# Patient Record
Sex: FEMALE | Race: WHITE | NOT HISPANIC OR LATINO | Employment: UNEMPLOYED | URBAN - METROPOLITAN AREA
[De-identification: names, ages, dates, MRNs, and addresses within clinical notes are randomized per-mention and may not be internally consistent; named-entity substitution may affect disease eponyms.]

---

## 2018-04-11 ENCOUNTER — LAB VISIT (OUTPATIENT)
Dept: LAB | Facility: HOSPITAL | Age: 48
End: 2018-04-11
Attending: FAMILY MEDICINE
Payer: MEDICAID

## 2018-04-11 ENCOUNTER — OFFICE VISIT (OUTPATIENT)
Dept: FAMILY MEDICINE | Facility: CLINIC | Age: 48
End: 2018-04-11
Payer: MEDICAID

## 2018-04-11 VITALS
HEIGHT: 63 IN | WEIGHT: 183 LBS | BODY MASS INDEX: 32.43 KG/M2 | DIASTOLIC BLOOD PRESSURE: 57 MMHG | OXYGEN SATURATION: 97 % | TEMPERATURE: 97 F | SYSTOLIC BLOOD PRESSURE: 124 MMHG | HEART RATE: 82 BPM | RESPIRATION RATE: 16 BRPM

## 2018-04-11 DIAGNOSIS — E03.9 ACQUIRED HYPOTHYROIDISM: ICD-10-CM

## 2018-04-11 DIAGNOSIS — E03.9 ACQUIRED HYPOTHYROIDISM: Primary | ICD-10-CM

## 2018-04-11 DIAGNOSIS — M62.838 MUSCLE SPASM: ICD-10-CM

## 2018-04-11 LAB
ALBUMIN SERPL BCP-MCNC: 4.6 G/DL
ALP SERPL-CCNC: 49 U/L
ALT SERPL W/O P-5'-P-CCNC: 27 U/L
ANION GAP SERPL CALC-SCNC: 8 MMOL/L
AST SERPL-CCNC: 26 U/L
BASOPHILS # BLD AUTO: 0.08 K/UL
BASOPHILS NFR BLD: 1.1 %
BILIRUB SERPL-MCNC: 0.4 MG/DL
BUN SERPL-MCNC: 16 MG/DL
CALCIUM SERPL-MCNC: 10 MG/DL
CHLORIDE SERPL-SCNC: 105 MMOL/L
CHOLEST SERPL-MCNC: 207 MG/DL
CHOLEST/HDLC SERPL: 5.2 {RATIO}
CO2 SERPL-SCNC: 25 MMOL/L
CREAT SERPL-MCNC: 0.5 MG/DL
DIFFERENTIAL METHOD: NORMAL
EOSINOPHIL # BLD AUTO: 0.2 K/UL
EOSINOPHIL NFR BLD: 2.2 %
ERYTHROCYTE [DISTWIDTH] IN BLOOD BY AUTOMATED COUNT: 11.9 %
EST. GFR  (AFRICAN AMERICAN): >60 ML/MIN/1.73 M^2
EST. GFR  (NON AFRICAN AMERICAN): >60 ML/MIN/1.73 M^2
GLUCOSE SERPL-MCNC: 96 MG/DL
HCT VFR BLD AUTO: 42.9 %
HDLC SERPL-MCNC: 40 MG/DL
HDLC SERPL: 19.3 %
HGB BLD-MCNC: 14.8 G/DL
IMM GRANULOCYTES # BLD AUTO: 0.02 K/UL
IMM GRANULOCYTES NFR BLD AUTO: 0.3 %
LDLC SERPL CALC-MCNC: 140.4 MG/DL
LYMPHOCYTES # BLD AUTO: 3 K/UL
LYMPHOCYTES NFR BLD: 41.4 %
MCH RBC QN AUTO: 30.5 PG
MCHC RBC AUTO-ENTMCNC: 34.5 G/DL
MCV RBC AUTO: 88 FL
MONOCYTES # BLD AUTO: 0.4 K/UL
MONOCYTES NFR BLD: 5.3 %
NEUTROPHILS # BLD AUTO: 3.6 K/UL
NEUTROPHILS NFR BLD: 49.7 %
NONHDLC SERPL-MCNC: 167 MG/DL
NRBC BLD-RTO: 0 /100 WBC
PLATELET # BLD AUTO: 280 K/UL
PMV BLD AUTO: 10.3 FL
POTASSIUM SERPL-SCNC: 4.3 MMOL/L
PROT SERPL-MCNC: 7.9 G/DL
RBC # BLD AUTO: 4.86 M/UL
SODIUM SERPL-SCNC: 138 MMOL/L
T4 FREE SERPL-MCNC: 1.31 NG/DL
TRIGL SERPL-MCNC: 133 MG/DL
TSH SERPL DL<=0.005 MIU/L-ACNC: 0.12 UIU/ML
WBC # BLD AUTO: 7.23 K/UL

## 2018-04-11 PROCEDURE — 99203 OFFICE O/P NEW LOW 30 MIN: CPT | Mod: PBBFAC,PN | Performed by: FAMILY MEDICINE

## 2018-04-11 PROCEDURE — 84439 ASSAY OF FREE THYROXINE: CPT

## 2018-04-11 PROCEDURE — 84443 ASSAY THYROID STIM HORMONE: CPT

## 2018-04-11 PROCEDURE — 80061 LIPID PANEL: CPT

## 2018-04-11 PROCEDURE — 99213 OFFICE O/P EST LOW 20 MIN: CPT | Mod: S$PBB,,, | Performed by: FAMILY MEDICINE

## 2018-04-11 PROCEDURE — 80053 COMPREHEN METABOLIC PANEL: CPT

## 2018-04-11 PROCEDURE — 99999 PR PBB SHADOW E&M-NEW PATIENT-LVL III: CPT | Mod: PBBFAC,,, | Performed by: FAMILY MEDICINE

## 2018-04-11 PROCEDURE — 36415 COLL VENOUS BLD VENIPUNCTURE: CPT

## 2018-04-11 PROCEDURE — 85025 COMPLETE CBC W/AUTO DIFF WBC: CPT

## 2018-04-11 RX ORDER — LEVOTHYROXINE SODIUM 112 UG/1
TABLET ORAL
COMMUNITY
End: 2018-06-03

## 2018-04-11 RX ORDER — LEVOTHYROXINE SODIUM 125 UG/1
TABLET ORAL
COMMUNITY
End: 2018-07-12

## 2018-04-11 RX ORDER — IBUPROFEN 800 MG/1
TABLET ORAL
COMMUNITY
End: 2019-11-27

## 2018-04-11 RX ORDER — TIZANIDINE 4 MG/1
TABLET ORAL
COMMUNITY
End: 2019-09-27 | Stop reason: SDUPTHER

## 2018-04-11 RX ORDER — ALPRAZOLAM 0.5 MG/1
TABLET ORAL
COMMUNITY
End: 2021-03-31

## 2018-04-11 RX ORDER — OMEPRAZOLE 40 MG/1
CAPSULE, DELAYED RELEASE ORAL
COMMUNITY
End: 2018-06-03

## 2018-04-11 RX ORDER — ZOLPIDEM TARTRATE 10 MG/1
TABLET ORAL
COMMUNITY
End: 2018-08-10 | Stop reason: SDUPTHER

## 2018-04-11 RX ORDER — ONDANSETRON 4 MG/1
TABLET, FILM COATED ORAL
COMMUNITY
End: 2019-09-27

## 2018-04-11 RX ORDER — NAPROXEN 500 MG/1
TABLET ORAL
COMMUNITY
Start: 2018-03-05 | End: 2019-11-27

## 2018-04-11 RX ORDER — LOVASTATIN 10 MG/1
TABLET ORAL
COMMUNITY
Start: 2018-03-09 | End: 2019-02-18

## 2018-04-11 RX ORDER — ROPINIROLE 0.5 MG/1
TABLET, FILM COATED ORAL
COMMUNITY
End: 2018-07-12 | Stop reason: SDUPTHER

## 2018-04-11 NOTE — PROGRESS NOTES
Subjective:       Patient ID: Jazmine Causey is a 47 y.o. female.    Chief Complaint: Abdominal Pain (R side ) and Follow-up    Stil having some back pain with some muscle spasms, triggered by lifting a heavy object,     Taking levothyroxine, good energy level, weight stable, feels great.      Review of Systems   Constitutional: Negative for activity change, appetite change, chills, fatigue and fever.   HENT: Negative for congestion, dental problem, facial swelling, nosebleeds, postnasal drip, sinus pain, sore throat, trouble swallowing and voice change.    Eyes: Negative for pain, discharge and visual disturbance.   Respiratory: Negative for apnea, cough, chest tightness and shortness of breath.    Cardiovascular: Negative for chest pain and palpitations.   Gastrointestinal: Negative for abdominal pain, blood in stool, constipation and nausea.   Endocrine: Negative for cold intolerance, polydipsia and polyuria.   Genitourinary: Negative for difficulty urinating, enuresis and flank pain.   Musculoskeletal: Positive for myalgias. Negative for arthralgias and back pain.   Skin: Negative for color change.   Allergic/Immunologic: Negative for environmental allergies and immunocompromised state.   Neurological: Negative for dizziness and light-headedness.   Hematological: Negative for adenopathy.   Psychiatric/Behavioral: Negative for agitation, behavioral problems, decreased concentration and dysphoric mood. The patient is not nervous/anxious.    All other systems reviewed and are negative.      Past Medical History:   Diagnosis Date    GERD (gastroesophageal reflux disease)     Hyperlipidemia      Past Surgical History:   Procedure Laterality Date    ENDOMETRIAL ABLATION      TUBAL LIGATION       Social History     Social History    Marital status: Single     Spouse name: N/A    Number of children: N/A    Years of education: N/A     Occupational History    Not on file.     Social History Main Topics     "Smoking status: Current Every Day Smoker     Packs/day: 1.00    Smokeless tobacco: Not on file    Alcohol use No    Drug use: Unknown    Sexual activity: Not on file     Other Topics Concern    Not on file     Social History Narrative    No narrative on file     No family history on file.    Objective:      BP (!) 124/57 (BP Location: Right arm, Patient Position: Sitting)   Pulse 82   Temp 97.3 °F (36.3 °C)   Resp 16   Ht 5' 3" (1.6 m)   Wt 83 kg (183 lb)   SpO2 97%   BMI 32.42 kg/m²   Physical Exam   Constitutional: She is oriented to person, place, and time. She appears well-developed and well-nourished. No distress.   HENT:   Head: Normocephalic and atraumatic.   Nose: Nose normal.   Mouth/Throat: Oropharynx is clear and moist. No oropharyngeal exudate.   Eyes: Conjunctivae and EOM are normal. Pupils are equal, round, and reactive to light. No scleral icterus.   Neck: Normal range of motion. Neck supple. No thyromegaly present.   Cardiovascular: Normal rate, regular rhythm and normal heart sounds.  Exam reveals no gallop and no friction rub.    No murmur heard.  Pulmonary/Chest: Effort normal and breath sounds normal. No respiratory distress. She has no wheezes. She has no rales. She exhibits no tenderness.   Abdominal: Soft. Bowel sounds are normal. She exhibits no distension. There is no tenderness. There is no guarding.   Musculoskeletal: Normal range of motion. She exhibits tenderness (mild tenderness of r paraspinals.). She exhibits no edema or deformity.   Lymphadenopathy:     She has no cervical adenopathy.   Neurological: She is alert and oriented to person, place, and time. She displays normal reflexes. No cranial nerve deficit or sensory deficit. She exhibits normal muscle tone.   Skin: Skin is warm and dry. No rash noted. She is not diaphoretic. No erythema. No pallor.   Psychiatric: She has a normal mood and affect. Her behavior is normal. Judgment and thought content normal.   Nursing " note and vitals reviewed.      Assessment:       1. Acquired hypothyroidism    2. Muscle spasm        Plan:       Acquired hypothyroidism  -     TSH; Future; Expected date: 04/11/2018  -     Comprehensive metabolic panel; Future; Expected date: 04/11/2018  -     CBC auto differential; Future; Expected date: 04/11/2018  -     Lipid panel; Future; Expected date: 04/11/2018    Muscle spasm  - Encouraged patient to take tizanidine.            Risks, benefits, and side effects were discussed with the patient. All questions were answered to the fullest satisfaction of the patient, and pt verbalized understanding and agreement to treatment plan. Pt was to call with any new or worsening symptoms, or present to the ER.

## 2018-04-12 ENCOUNTER — TELEPHONE (OUTPATIENT)
Dept: FAMILY MEDICINE | Facility: CLINIC | Age: 48
End: 2018-04-12

## 2018-04-12 NOTE — TELEPHONE ENCOUNTER
----- Message from Francisco Javier Cloud MD sent at 4/12/2018  7:59 AM CDT -----  Please let this lady know that her labs look great, save that her thyroid function shows that we may her on too strong a dose of medicine. However, I would like to recheck again in 3 months since she is feeling so well before adjusting the dose.

## 2018-06-03 PROCEDURE — 99284 EMERGENCY DEPT VISIT MOD MDM: CPT | Mod: 25

## 2018-06-03 PROCEDURE — 96372 THER/PROPH/DIAG INJ SC/IM: CPT

## 2018-06-03 PROCEDURE — 99283 EMERGENCY DEPT VISIT LOW MDM: CPT | Mod: 25

## 2018-06-04 ENCOUNTER — HOSPITAL ENCOUNTER (EMERGENCY)
Facility: HOSPITAL | Age: 48
Discharge: HOME | End: 2018-06-04
Attending: EMERGENCY MEDICINE
Payer: MEDICAID

## 2018-06-04 VITALS
WEIGHT: 180 LBS | OXYGEN SATURATION: 98 % | SYSTOLIC BLOOD PRESSURE: 148 MMHG | HEART RATE: 82 BPM | RESPIRATION RATE: 18 BRPM | HEIGHT: 63 IN | TEMPERATURE: 98 F | BODY MASS INDEX: 31.89 KG/M2 | DIASTOLIC BLOOD PRESSURE: 82 MMHG

## 2018-06-04 DIAGNOSIS — J02.9 PHARYNGITIS, UNSPECIFIED ETIOLOGY: Primary | ICD-10-CM

## 2018-06-04 DIAGNOSIS — J32.9 SINUSITIS, UNSPECIFIED CHRONICITY, UNSPECIFIED LOCATION: ICD-10-CM

## 2018-06-04 LAB — DEPRECATED S PYO AG THROAT QL EIA: NEGATIVE

## 2018-06-04 PROCEDURE — 87081 CULTURE SCREEN ONLY: CPT

## 2018-06-04 PROCEDURE — 87880 STREP A ASSAY W/OPTIC: CPT

## 2018-06-04 PROCEDURE — 96372 THER/PROPH/DIAG INJ SC/IM: CPT

## 2018-06-04 PROCEDURE — 63600175 PHARM REV CODE 636 W HCPCS: Performed by: EMERGENCY MEDICINE

## 2018-06-04 RX ORDER — DEXAMETHASONE SODIUM PHOSPHATE 100 MG/10ML
10 INJECTION INTRAMUSCULAR; INTRAVENOUS
Status: COMPLETED | OUTPATIENT
Start: 2018-06-04 | End: 2018-06-04

## 2018-06-04 RX ORDER — ALBUTEROL SULFATE 90 UG/1
1-2 AEROSOL, METERED RESPIRATORY (INHALATION) EVERY 6 HOURS PRN
Qty: 1 INHALER | Refills: 0 | Status: SHIPPED | OUTPATIENT
Start: 2018-06-04 | End: 2022-05-24

## 2018-06-04 RX ORDER — AMOXICILLIN AND CLAVULANATE POTASSIUM 875; 125 MG/1; MG/1
1 TABLET, FILM COATED ORAL 2 TIMES DAILY
Qty: 14 TABLET | Refills: 0 | Status: SHIPPED | OUTPATIENT
Start: 2018-06-04 | End: 2018-07-12

## 2018-06-04 RX ORDER — PREDNISONE 10 MG/1
40 TABLET ORAL DAILY
Qty: 20 TABLET | Refills: 0 | Status: SHIPPED | OUTPATIENT
Start: 2018-06-04 | End: 2018-06-09

## 2018-06-04 RX ADMIN — DEXAMETHASONE SODIUM PHOSPHATE 10 MG: 10 INJECTION INTRAMUSCULAR; INTRAVENOUS at 01:06

## 2018-06-05 NOTE — ED PROVIDER NOTES
Encounter Date: 6/3/2018       History     Chief Complaint   Patient presents with    Sore Throat    Cough     47 y f complains of sore throat and cough with facial congestion and post nasal drip             Review of patient's allergies indicates:   Allergen Reactions    Cephalexin      Past Medical History:   Diagnosis Date    GERD (gastroesophageal reflux disease)     Hyperlipidemia      Past Surgical History:   Procedure Laterality Date    ENDOMETRIAL ABLATION      TUBAL LIGATION       No family history on file.  Social History   Substance Use Topics    Smoking status: Current Every Day Smoker     Packs/day: 1.00    Smokeless tobacco: Never Used    Alcohol use No     Review of Systems   Constitutional: Negative.  Negative for fever.   HENT: Positive for congestion, postnasal drip, sinus pain, sinus pressure and sore throat. Negative for ear discharge, ear pain, facial swelling, sneezing, trouble swallowing and voice change.    Respiratory: Positive for cough and wheezing.    Cardiovascular: Negative.    Gastrointestinal: Negative.    Musculoskeletal: Negative.    Skin: Negative.    Hematological: Negative.    All other systems reviewed and are negative.      Physical Exam     Initial Vitals [06/03/18 2247]   BP Pulse Resp Temp SpO2   (!) 158/85 84 18 98.3 °F (36.8 °C) 97 %      MAP       109.33         Physical Exam    Nursing note and vitals reviewed.  Constitutional: She appears well-developed and well-nourished. She is not diaphoretic. No distress.   HENT:   Head: Normocephalic and atraumatic.   Nose: Nose normal.   Mouth/Throat: Oropharynx is clear and moist. No oropharyngeal exudate.   Eyes: Conjunctivae and EOM are normal. Pupils are equal, round, and reactive to light. Right eye exhibits no discharge. Left eye exhibits no discharge. No scleral icterus.   Neck: Normal range of motion. Neck supple.   Cardiovascular: Normal rate, regular rhythm, normal heart sounds and intact distal pulses. Exam  reveals no gallop and no friction rub.    No murmur heard.  Pulmonary/Chest: No respiratory distress. She has wheezes. She has no rhonchi. She has no rales.   Abdominal: Soft. There is no tenderness.   Musculoskeletal: Normal range of motion. She exhibits no edema or tenderness.   Lymphadenopathy:     She has no cervical adenopathy.   Neurological: She is alert and oriented to person, place, and time. She has normal strength. No cranial nerve deficit or sensory deficit.   Skin: Skin is warm and dry. Capillary refill takes less than 2 seconds. No rash noted. No erythema. No pallor.   Psychiatric: She has a normal mood and affect. Her behavior is normal. Judgment and thought content normal.         ED Course   Procedures  Labs Reviewed   THROAT SCREEN, RAPID   CULTURE, STREP A,  THROAT             Medical Decision Making:   Initial Assessment:   Sinusitis / bronchitis with min wheeze    Treatment as per AVS                       Clinical Impression:   The primary encounter diagnosis was Pharyngitis, unspecified etiology. A diagnosis of Sinusitis, unspecified chronicity, unspecified location was also pertinent to this visit.    No orders to display       Disposition:   Disposition: Discharged  Condition: Stable                        Quintin Alvarez MD  06/05/18 9029

## 2018-06-06 LAB — BACTERIA THROAT CULT: NORMAL

## 2018-07-12 ENCOUNTER — OFFICE VISIT (OUTPATIENT)
Dept: FAMILY MEDICINE | Facility: CLINIC | Age: 48
End: 2018-07-12
Payer: MEDICAID

## 2018-07-12 ENCOUNTER — LAB VISIT (OUTPATIENT)
Dept: LAB | Facility: HOSPITAL | Age: 48
End: 2018-07-12
Attending: FAMILY MEDICINE
Payer: MEDICAID

## 2018-07-12 VITALS
WEIGHT: 186 LBS | SYSTOLIC BLOOD PRESSURE: 133 MMHG | RESPIRATION RATE: 16 BRPM | DIASTOLIC BLOOD PRESSURE: 49 MMHG | BODY MASS INDEX: 32.96 KG/M2 | OXYGEN SATURATION: 98 % | HEIGHT: 63 IN | HEART RATE: 68 BPM

## 2018-07-12 DIAGNOSIS — E03.9 HYPOTHYROIDISM, UNSPECIFIED TYPE: ICD-10-CM

## 2018-07-12 DIAGNOSIS — H10.9 CONJUNCTIVITIS, UNSPECIFIED CONJUNCTIVITIS TYPE, UNSPECIFIED LATERALITY: ICD-10-CM

## 2018-07-12 DIAGNOSIS — G25.81 RESTLESS LEG: ICD-10-CM

## 2018-07-12 DIAGNOSIS — I73.9 PVD (PERIPHERAL VASCULAR DISEASE): Primary | ICD-10-CM

## 2018-07-12 LAB
T4 FREE SERPL-MCNC: 0.88 NG/DL
TSH SERPL DL<=0.005 MIU/L-ACNC: 1.15 UIU/ML

## 2018-07-12 PROCEDURE — 99214 OFFICE O/P EST MOD 30 MIN: CPT | Mod: S$PBB,,, | Performed by: FAMILY MEDICINE

## 2018-07-12 PROCEDURE — 36415 COLL VENOUS BLD VENIPUNCTURE: CPT

## 2018-07-12 PROCEDURE — 84439 ASSAY OF FREE THYROXINE: CPT

## 2018-07-12 PROCEDURE — 99999 PR PBB SHADOW E&M-EST. PATIENT-LVL III: CPT | Mod: PBBFAC,,, | Performed by: FAMILY MEDICINE

## 2018-07-12 PROCEDURE — 84443 ASSAY THYROID STIM HORMONE: CPT

## 2018-07-12 PROCEDURE — 99213 OFFICE O/P EST LOW 20 MIN: CPT | Mod: PBBFAC,PN | Performed by: FAMILY MEDICINE

## 2018-07-12 RX ORDER — LEVOTHYROXINE SODIUM 112 UG/1
TABLET ORAL
COMMUNITY
End: 2018-08-10 | Stop reason: SDUPTHER

## 2018-07-12 RX ORDER — POLYMYXIN B SULFATE AND TRIMETHOPRIM 1; 10000 MG/ML; [USP'U]/ML
1 SOLUTION OPHTHALMIC EVERY 6 HOURS
Qty: 10 ML | Refills: 1 | Status: SHIPPED | OUTPATIENT
Start: 2018-07-12 | End: 2023-09-26 | Stop reason: SDUPTHER

## 2018-07-12 RX ORDER — ROPINIROLE 1 MG/1
1 TABLET, FILM COATED ORAL NIGHTLY
Qty: 90 TABLET | Refills: 3 | Status: SHIPPED | OUTPATIENT
Start: 2018-07-12 | End: 2018-10-12 | Stop reason: SDUPTHER

## 2018-07-12 NOTE — PROGRESS NOTES
Subjective:       Patient ID: Jazmine Causey is a 47 y.o. female.    Chief Complaint: Follow-up; Knee Pain (R knee pain); and Leg Pain (R leg pain)    Patient presents for follow up.      She complains of pain in her legs, feet turning blue, with prolonged standing; noticed a few months ago. Happens daily.    In regards to their hypothyroidism, patient denies poor energy, skin and hair changes, as well as weight gain. Last TSH normal/symmetric, will recheck as per orders.         Also complains of improvement in rls with requip, but not being completely controlled.      Review of Systems   Constitutional: Negative for activity change, appetite change, chills, fatigue and fever.   HENT: Negative for congestion, dental problem, facial swelling, nosebleeds, postnasal drip, sinus pain, sore throat, trouble swallowing and voice change.    Eyes: Negative for pain, discharge and visual disturbance.   Respiratory: Negative for apnea, cough, chest tightness and shortness of breath.    Cardiovascular: Negative for chest pain and palpitations.   Gastrointestinal: Negative for abdominal pain, blood in stool, constipation and nausea.   Endocrine: Negative for cold intolerance, polydipsia and polyuria.   Genitourinary: Negative for difficulty urinating, enuresis and flank pain.   Musculoskeletal: Positive for myalgias. Negative for arthralgias and back pain.   Skin: Negative for color change.   Allergic/Immunologic: Negative for environmental allergies and immunocompromised state.   Neurological: Negative for dizziness and light-headedness.   Hematological: Negative for adenopathy.   Psychiatric/Behavioral: Negative for agitation, behavioral problems, decreased concentration and dysphoric mood. The patient is not nervous/anxious.    All other systems reviewed and are negative.        Reviewed family, medical, surgical, and social history.    Objective:      BP (!) 133/49 (BP Location: Left arm, Patient Position: Sitting, BP  "Method: Medium (Automatic))   Pulse 68   Resp 16   Ht 5' 3" (1.6 m)   Wt 84.4 kg (186 lb)   SpO2 98%   BMI 32.95 kg/m²   Physical Exam   Constitutional: She is oriented to person, place, and time. She appears well-developed and well-nourished. No distress.   HENT:   Head: Normocephalic and atraumatic.   Nose: Nose normal.   Mouth/Throat: Oropharynx is clear and moist. No oropharyngeal exudate.   Eyes: Conjunctivae and EOM are normal. Pupils are equal, round, and reactive to light. No scleral icterus.   Neck: Normal range of motion. Neck supple. No thyromegaly present.   Cardiovascular: Normal rate, regular rhythm and normal heart sounds.  Exam reveals no gallop and no friction rub.    No murmur heard.  Pulmonary/Chest: Effort normal and breath sounds normal. No respiratory distress. She has no wheezes. She has no rales. She exhibits no tenderness.   Abdominal: Soft. Bowel sounds are normal. She exhibits no distension. There is no tenderness. There is no guarding.   Musculoskeletal: Normal range of motion. She exhibits tenderness and deformity. She exhibits no edema.   Lymphadenopathy:     She has no cervical adenopathy.   Neurological: She is alert and oriented to person, place, and time. She displays normal reflexes. No cranial nerve deficit or sensory deficit. She exhibits normal muscle tone.   Skin: Skin is warm and dry. No rash noted. She is not diaphoretic. No erythema. No pallor.   Psychiatric: She has a normal mood and affect. Her behavior is normal. Judgment and thought content normal.   Nursing note and vitals reviewed.      Assessment:       1. PVD (peripheral vascular disease)    2. Restless leg    3. Hypothyroidism, unspecified type    4. Conjunctivitis, unspecified conjunctivitis type, unspecified laterality        Plan:       PVD (peripheral vascular disease)  -     US Lower Extremity Arteries Bilateral; Future; Expected date: 07/12/2018    Restless leg  -     rOPINIRole (REQUIP) 1 MG tablet; " Take 1 tablet (1 mg total) by mouth every evening.  Dispense: 90 tablet; Refill: 3    Hypothyroidism, unspecified type  -     TSH; Future; Expected date: 07/12/2018  -     T4, free; Future; Expected date: 07/12/2018    Conjunctivitis, unspecified conjunctivitis type, unspecified laterality  -     polymyxin B sulf-trimethoprim (POLYTRIM) 10,000 unit- 1 mg/mL Drop; Place 1 drop into the right eye every 6 (six) hours.  Dispense: 10 mL; Refill: 1            Risks, benefits, and side effects were discussed with the patient. All questions were answered to the fullest satisfaction of the patient, and pt verbalized understanding and agreement to treatment plan. Pt was to call with any new or worsening symptoms, or present to the ER.

## 2018-07-13 ENCOUNTER — HOSPITAL ENCOUNTER (OUTPATIENT)
Dept: RADIOLOGY | Facility: HOSPITAL | Age: 48
Discharge: HOME | End: 2018-07-13
Attending: FAMILY MEDICINE
Payer: MEDICAID

## 2018-07-13 ENCOUNTER — TELEPHONE (OUTPATIENT)
Dept: FAMILY MEDICINE | Facility: CLINIC | Age: 48
End: 2018-07-13

## 2018-07-13 DIAGNOSIS — I73.9 PVD (PERIPHERAL VASCULAR DISEASE): ICD-10-CM

## 2018-07-13 PROCEDURE — 93922 UPR/L XTREMITY ART 2 LEVELS: CPT | Mod: 26,,, | Performed by: RADIOLOGY

## 2018-07-13 PROCEDURE — 93922 UPR/L XTREMITY ART 2 LEVELS: CPT | Mod: TC

## 2018-07-13 PROCEDURE — 93925 LOWER EXTREMITY STUDY: CPT | Mod: 26,,, | Performed by: RADIOLOGY

## 2018-07-13 NOTE — TELEPHONE ENCOUNTER
----- Message from Francisco Javier Cloud MD sent at 7/13/2018  8:08 AM CDT -----  Thyroid level is perfect, now, and I would like for her to continue the current dose of levothyroxine.

## 2018-07-13 NOTE — TELEPHONE ENCOUNTER
Spoke with Candace, sister, and relayed that thyroid level is good now and to continue current dose of levothyroxine.  Carla

## 2018-07-16 ENCOUNTER — TELEPHONE (OUTPATIENT)
Dept: FAMILY MEDICINE | Facility: CLINIC | Age: 48
End: 2018-07-16

## 2018-07-16 NOTE — TELEPHONE ENCOUNTER
----- Message from Francisco Javier Cloud MD sent at 7/16/2018 12:40 PM CDT -----  Please let this lady know that her ultrasound was essentially fine

## 2018-08-10 RX ORDER — LEVOTHYROXINE SODIUM 112 UG/1
112 TABLET ORAL
Qty: 30 TABLET | Refills: 11 | Status: SHIPPED | OUTPATIENT
Start: 2018-08-10 | End: 2018-10-12 | Stop reason: SDUPTHER

## 2018-08-10 NOTE — TELEPHONE ENCOUNTER
----- Message from RT Juana sent at 8/10/2018  3:45 PM CDT -----  Contact: Kelsey,[phone], [facility]  Kelsey,[phone], [facility], requesting medication refill: Ambien, thanks.

## 2018-08-13 RX ORDER — ZOLPIDEM TARTRATE 10 MG/1
10 TABLET ORAL NIGHTLY
Qty: 30 TABLET | Refills: 5 | Status: SHIPPED | OUTPATIENT
Start: 2018-08-13 | End: 2019-09-23 | Stop reason: SDUPTHER

## 2018-09-12 ENCOUNTER — TELEPHONE (OUTPATIENT)
Dept: FAMILY MEDICINE | Facility: CLINIC | Age: 48
End: 2018-09-12

## 2018-09-12 DIAGNOSIS — Z12.39 BREAST CANCER SCREENING: Primary | ICD-10-CM

## 2018-09-12 NOTE — TELEPHONE ENCOUNTER
----- Message from Katie Garces sent at 9/12/2018 12:46 PM CDT -----  Contact: YUNG RENEE [[id number]]            Name of Who is Calling: YUNG RENEE [[id number]]      What is the request in detail:  Patient called to request orders for her mammo. Please call her.     Can the clinic reply by [facility]: no      [facility] [phone]

## 2018-09-18 ENCOUNTER — HOSPITAL ENCOUNTER (OUTPATIENT)
Dept: RADIOLOGY | Facility: HOSPITAL | Age: 48
Discharge: HOME | End: 2018-09-18
Attending: FAMILY MEDICINE
Payer: MEDICAID

## 2018-09-18 VITALS — WEIGHT: 185 LBS | HEIGHT: 63 IN | BODY MASS INDEX: 32.78 KG/M2

## 2018-09-18 DIAGNOSIS — Z12.39 BREAST CANCER SCREENING: ICD-10-CM

## 2018-09-18 PROCEDURE — 77067 SCR MAMMO BI INCL CAD: CPT | Mod: 26,,, | Performed by: RADIOLOGY

## 2018-09-18 PROCEDURE — 77067 SCR MAMMO BI INCL CAD: CPT | Mod: TC

## 2018-09-20 ENCOUNTER — TELEPHONE (OUTPATIENT)
Dept: FAMILY MEDICINE | Facility: CLINIC | Age: 48
End: 2018-09-20

## 2018-09-20 NOTE — TELEPHONE ENCOUNTER
Notified pt of normal results. No other concerns at this time.         ----- Message from Francisco Javier Cloud MD sent at 9/19/2018  6:38 PM CDT -----  Please notify patient that their result(s) were normal.

## 2018-10-01 ENCOUNTER — TELEPHONE (OUTPATIENT)
Dept: FAMILY MEDICINE | Facility: CLINIC | Age: 48
End: 2018-10-01

## 2018-10-01 NOTE — TELEPHONE ENCOUNTER
----- Message from Jose Ramon Vital sent at 10/1/2018  4:00 PM CDT -----  Contact: Candace Lyman (Sister)  Name of Who is Calling: Candace      What is the request in detail: Candace is calling requesting to have patient seen on Wednesday due to having muscle spasm in her arms, and legs      Can the clinic reply by [facility]:       [facility] [phone]

## 2018-10-12 ENCOUNTER — TELEPHONE (OUTPATIENT)
Dept: FAMILY MEDICINE | Facility: CLINIC | Age: 48
End: 2018-10-12

## 2018-10-12 ENCOUNTER — LAB VISIT (OUTPATIENT)
Dept: LAB | Facility: HOSPITAL | Age: 48
End: 2018-10-12
Attending: FAMILY MEDICINE
Payer: MEDICAID

## 2018-10-12 ENCOUNTER — OFFICE VISIT (OUTPATIENT)
Dept: FAMILY MEDICINE | Facility: CLINIC | Age: 48
End: 2018-10-12
Payer: MEDICAID

## 2018-10-12 VITALS
HEIGHT: 63 IN | BODY MASS INDEX: 34.75 KG/M2 | RESPIRATION RATE: 18 BRPM | OXYGEN SATURATION: 97 % | SYSTOLIC BLOOD PRESSURE: 137 MMHG | HEART RATE: 79 BPM | WEIGHT: 196.13 LBS | DIASTOLIC BLOOD PRESSURE: 75 MMHG

## 2018-10-12 DIAGNOSIS — G25.81 RESTLESS LEG: ICD-10-CM

## 2018-10-12 DIAGNOSIS — R21 RASH: ICD-10-CM

## 2018-10-12 DIAGNOSIS — E03.9 HYPOTHYROIDISM, UNSPECIFIED TYPE: ICD-10-CM

## 2018-10-12 DIAGNOSIS — E78.5 HYPERLIPIDEMIA, UNSPECIFIED HYPERLIPIDEMIA TYPE: ICD-10-CM

## 2018-10-12 DIAGNOSIS — E03.9 HYPOTHYROIDISM, UNSPECIFIED TYPE: Primary | ICD-10-CM

## 2018-10-12 LAB
ALBUMIN SERPL BCP-MCNC: 4.3 G/DL
ALP SERPL-CCNC: 62 U/L
ALT SERPL W/O P-5'-P-CCNC: 46 U/L
ANION GAP SERPL CALC-SCNC: 9 MMOL/L
AST SERPL-CCNC: 39 U/L
BASOPHILS # BLD AUTO: 0.08 K/UL
BASOPHILS NFR BLD: 0.9 %
BILIRUB SERPL-MCNC: 0.5 MG/DL
BUN SERPL-MCNC: 15 MG/DL
CALCIUM SERPL-MCNC: 9.8 MG/DL
CHLORIDE SERPL-SCNC: 104 MMOL/L
CHOLEST SERPL-MCNC: 270 MG/DL
CHOLEST/HDLC SERPL: 6.3 {RATIO}
CO2 SERPL-SCNC: 25 MMOL/L
CREAT SERPL-MCNC: 0.7 MG/DL
DIFFERENTIAL METHOD: NORMAL
EOSINOPHIL # BLD AUTO: 0.2 K/UL
EOSINOPHIL NFR BLD: 2.2 %
ERYTHROCYTE [DISTWIDTH] IN BLOOD BY AUTOMATED COUNT: 12.8 %
EST. GFR  (AFRICAN AMERICAN): >60 ML/MIN/1.73 M^2
EST. GFR  (NON AFRICAN AMERICAN): >60 ML/MIN/1.73 M^2
GLUCOSE SERPL-MCNC: 100 MG/DL
HCT VFR BLD AUTO: 42.6 %
HDLC SERPL-MCNC: 43 MG/DL
HDLC SERPL: 15.9 %
HGB BLD-MCNC: 14.5 G/DL
IMM GRANULOCYTES # BLD AUTO: 0.03 K/UL
IMM GRANULOCYTES NFR BLD AUTO: 0.4 %
LDLC SERPL CALC-MCNC: 199 MG/DL
LYMPHOCYTES # BLD AUTO: 3.3 K/UL
LYMPHOCYTES NFR BLD: 38 %
MCH RBC QN AUTO: 29.5 PG
MCHC RBC AUTO-ENTMCNC: 34 G/DL
MCV RBC AUTO: 87 FL
MONOCYTES # BLD AUTO: 0.4 K/UL
MONOCYTES NFR BLD: 5.1 %
NEUTROPHILS # BLD AUTO: 4.6 K/UL
NEUTROPHILS NFR BLD: 53.4 %
NONHDLC SERPL-MCNC: 227 MG/DL
NRBC BLD-RTO: 0 /100 WBC
PLATELET # BLD AUTO: 274 K/UL
PMV BLD AUTO: 9.8 FL
POTASSIUM SERPL-SCNC: 4.1 MMOL/L
PROT SERPL-MCNC: 7.5 G/DL
RBC # BLD AUTO: 4.91 M/UL
SODIUM SERPL-SCNC: 138 MMOL/L
T4 FREE SERPL-MCNC: 0.86 NG/DL
TRIGL SERPL-MCNC: 140 MG/DL
TSH SERPL DL<=0.005 MIU/L-ACNC: 17.65 UIU/ML
WBC # BLD AUTO: 8.56 K/UL

## 2018-10-12 PROCEDURE — 84443 ASSAY THYROID STIM HORMONE: CPT

## 2018-10-12 PROCEDURE — 99999 PR PBB SHADOW E&M-EST. PATIENT-LVL III: CPT | Mod: PBBFAC,,, | Performed by: FAMILY MEDICINE

## 2018-10-12 PROCEDURE — 99214 OFFICE O/P EST MOD 30 MIN: CPT | Mod: S$PBB,,, | Performed by: FAMILY MEDICINE

## 2018-10-12 PROCEDURE — 99213 OFFICE O/P EST LOW 20 MIN: CPT | Mod: PBBFAC,PN | Performed by: FAMILY MEDICINE

## 2018-10-12 PROCEDURE — 85025 COMPLETE CBC W/AUTO DIFF WBC: CPT

## 2018-10-12 PROCEDURE — 36415 COLL VENOUS BLD VENIPUNCTURE: CPT

## 2018-10-12 PROCEDURE — 84439 ASSAY OF FREE THYROXINE: CPT

## 2018-10-12 PROCEDURE — 80053 COMPREHEN METABOLIC PANEL: CPT

## 2018-10-12 PROCEDURE — 80061 LIPID PANEL: CPT

## 2018-10-12 RX ORDER — CLOTRIMAZOLE AND BETAMETHASONE DIPROPIONATE 10; .64 MG/G; MG/G
CREAM TOPICAL 2 TIMES DAILY
Qty: 15 G | Refills: 2 | Status: SHIPPED | OUTPATIENT
Start: 2018-10-12

## 2018-10-12 RX ORDER — ROPINIROLE 2 MG/1
2 TABLET, FILM COATED ORAL NIGHTLY
Qty: 30 TABLET | Refills: 2 | Status: SHIPPED | OUTPATIENT
Start: 2018-10-12 | End: 2020-06-17

## 2018-10-12 RX ORDER — LEVOTHYROXINE SODIUM 125 UG/1
125 TABLET ORAL
Qty: 30 TABLET | Refills: 2 | Status: SHIPPED | OUTPATIENT
Start: 2018-10-12 | End: 2018-12-11 | Stop reason: DRUGHIGH

## 2018-10-12 NOTE — TELEPHONE ENCOUNTER
Notified pt of lab results and increase of meds. Voiced understanding, no other concerns at this time.       ----- Message from Francisco Javier Cloud MD sent at 10/12/2018  2:09 PM CDT -----  Thyroid levels were very high, would you confirm that she is taking her thyroid medicine? If so, I will send in an increased dose.

## 2018-10-12 NOTE — TELEPHONE ENCOUNTER
Pt states that she has been taking her thyroid meds as ordered, and she is aware of the increase.   Thank you.       ----- Message from Francisco Javier Cloud MD sent at 10/12/2018  2:09 PM CDT -----  Thyroid levels were very high, would you confirm that she is taking her thyroid medicine? If so, I will send in an increased dose.

## 2018-10-12 NOTE — PROGRESS NOTES
"Subjective:       Patient ID: Jazmine Causey is a 47 y.o. female.    Chief Complaint: Follow-up; Rash (L hand); and Spasms    Follow up    Rash on R hand  Non-severe  Last week  No sick contacts    Muscle spasms in legs at night  Requip helping  No fever      Review of Systems   Constitutional: Negative for activity change, appetite change, chills, fatigue and fever.   HENT: Negative for congestion, dental problem, facial swelling, nosebleeds, postnasal drip, sinus pain, sore throat, trouble swallowing and voice change.    Eyes: Negative for pain, discharge and visual disturbance.   Respiratory: Negative for apnea, cough, chest tightness and shortness of breath.    Cardiovascular: Negative for chest pain and palpitations.   Gastrointestinal: Negative for abdominal pain, blood in stool, constipation and nausea.   Endocrine: Negative for cold intolerance, polydipsia and polyuria.   Genitourinary: Negative for difficulty urinating, enuresis and flank pain.   Musculoskeletal: Positive for myalgias. Negative for arthralgias and back pain.   Skin: Negative for color change.   Allergic/Immunologic: Negative for environmental allergies and immunocompromised state.   Neurological: Negative for dizziness and light-headedness.   Hematological: Negative for adenopathy.   Psychiatric/Behavioral: Negative for agitation, behavioral problems, decreased concentration and dysphoric mood. The patient is not nervous/anxious.    All other systems reviewed and are negative.        Reviewed family, medical, surgical, and social history.    Objective:      /75 (BP Location: Left arm, Patient Position: Sitting, BP Method: Medium (Automatic))   Pulse 79   Resp 18   Ht 5' 3" (1.6 m)   Wt 89 kg (196 lb 1.6 oz)   SpO2 97%   BMI 34.74 kg/m²   Physical Exam   Constitutional: She is oriented to person, place, and time. She appears well-developed and well-nourished. No distress.   HENT:   Head: Normocephalic and atraumatic.   Nose: " Nose normal.   Mouth/Throat: Oropharynx is clear and moist. No oropharyngeal exudate.   Eyes: Conjunctivae and EOM are normal. Pupils are equal, round, and reactive to light. No scleral icterus.   Neck: Normal range of motion. Neck supple. No thyromegaly present.   Cardiovascular: Normal rate, regular rhythm and normal heart sounds. Exam reveals no gallop and no friction rub.   No murmur heard.  Pulmonary/Chest: Effort normal and breath sounds normal. No respiratory distress. She has no wheezes. She has no rales. She exhibits no tenderness.   Abdominal: Soft. Bowel sounds are normal. She exhibits no distension. There is no tenderness. There is no guarding.   Musculoskeletal: Normal range of motion. She exhibits tenderness and deformity. She exhibits no edema.   Lymphadenopathy:     She has no cervical adenopathy.   Neurological: She is alert and oriented to person, place, and time. She displays normal reflexes. No cranial nerve deficit or sensory deficit. She exhibits normal muscle tone.   Skin: Skin is warm and dry. No rash noted. She is not diaphoretic. No erythema. No pallor.   Psychiatric: She has a normal mood and affect. Her behavior is normal. Judgment and thought content normal.   Nursing note and vitals reviewed.      Assessment:       1. Hypothyroidism, unspecified type    2. Restless leg    3. Rash    4. Hyperlipidemia, unspecified hyperlipidemia type        Plan:       Hypothyroidism, unspecified type  -     Lipid panel; Future; Expected date: 10/12/2018  -     CBC auto differential; Future; Expected date: 10/12/2018  -     Comprehensive metabolic panel; Future; Expected date: 10/12/2018  -     TSH; Future; Expected date: 10/12/2018  -     T4, free; Future; Expected date: 10/12/2018    Restless leg  -     rOPINIRole (REQUIP) 2 MG tablet; Take 1 tablet (2 mg total) by mouth every evening.  Dispense: 30 tablet; Refill: 2  -     Lipid panel; Future; Expected date: 10/12/2018  -     CBC auto differential;  Future; Expected date: 10/12/2018  -     Comprehensive metabolic panel; Future; Expected date: 10/12/2018  -     TSH; Future; Expected date: 10/12/2018  -     T4, free; Future; Expected date: 10/12/2018    Rash  -     clotrimazole-betamethasone 1-0.05% (LOTRISONE) cream; Apply topically 2 (two) times daily.  Dispense: 15 g; Refill: 2    Hyperlipidemia, unspecified hyperlipidemia type            Risks, benefits, and side effects were discussed with the patient. All questions were answered to the fullest satisfaction of the patient, and pt verbalized understanding and agreement to treatment plan. Pt was to call with any new or worsening symptoms, or present to the ER.

## 2018-11-01 ENCOUNTER — OFFICE VISIT (OUTPATIENT)
Dept: FAMILY MEDICINE | Facility: CLINIC | Age: 48
End: 2018-11-01
Payer: MEDICAID

## 2018-11-01 VITALS
HEIGHT: 63 IN | SYSTOLIC BLOOD PRESSURE: 135 MMHG | WEIGHT: 194.69 LBS | TEMPERATURE: 98 F | BODY MASS INDEX: 34.5 KG/M2 | OXYGEN SATURATION: 97 % | HEART RATE: 83 BPM | RESPIRATION RATE: 18 BRPM | DIASTOLIC BLOOD PRESSURE: 74 MMHG

## 2018-11-01 DIAGNOSIS — H66.90 OTITIS MEDIA, UNSPECIFIED LATERALITY, UNSPECIFIED OTITIS MEDIA TYPE: Primary | ICD-10-CM

## 2018-11-01 PROCEDURE — 99214 OFFICE O/P EST MOD 30 MIN: CPT | Mod: S$PBB,,, | Performed by: FAMILY MEDICINE

## 2018-11-01 PROCEDURE — 96372 THER/PROPH/DIAG INJ SC/IM: CPT | Mod: PBBFAC,PN

## 2018-11-01 PROCEDURE — 99999 PR PBB SHADOW E&M-EST. PATIENT-LVL III: CPT | Mod: PBBFAC,,, | Performed by: FAMILY MEDICINE

## 2018-11-01 PROCEDURE — 99213 OFFICE O/P EST LOW 20 MIN: CPT | Mod: PBBFAC,PN | Performed by: FAMILY MEDICINE

## 2018-11-01 RX ORDER — PREDNISONE 20 MG/1
20 TABLET ORAL DAILY
Qty: 5 TABLET | Refills: 0 | Status: SHIPPED | OUTPATIENT
Start: 2018-11-01 | End: 2018-11-06

## 2018-11-01 RX ORDER — DEXAMETHASONE SODIUM PHOSPHATE 4 MG/ML
8 INJECTION, SOLUTION INTRA-ARTICULAR; INTRALESIONAL; INTRAMUSCULAR; INTRAVENOUS; SOFT TISSUE
Status: COMPLETED | OUTPATIENT
Start: 2018-11-01 | End: 2018-11-01

## 2018-11-01 RX ORDER — AZITHROMYCIN 250 MG/1
TABLET, FILM COATED ORAL
Qty: 6 TABLET | Refills: 0 | Status: SHIPPED | OUTPATIENT
Start: 2018-11-01 | End: 2018-12-10

## 2018-11-01 RX ORDER — PROMETHAZINE HYDROCHLORIDE AND DEXTROMETHORPHAN HYDROBROMIDE 6.25; 15 MG/5ML; MG/5ML
5 SYRUP ORAL EVERY 6 HOURS PRN
Qty: 240 ML | Refills: 1 | Status: SHIPPED | OUTPATIENT
Start: 2018-11-01 | End: 2019-09-27

## 2018-11-01 RX ADMIN — DEXAMETHASONE SODIUM PHOSPHATE 8 MG: 4 INJECTION INTRA-ARTICULAR; INTRALESIONAL; INTRAMUSCULAR; INTRAVENOUS; SOFT TISSUE at 04:11

## 2018-11-01 NOTE — PROGRESS NOTES
"Subjective:       Patient ID: Jazmine Causey is a 47 y.o. female.    Chief Complaint: Nasal Congestion and Cough    Follow up    Ear pain  Fullness  Vertigo  Worsening  Last few days        Review of Systems   Constitutional: Positive for activity change, appetite change and fever. Negative for chills and fatigue.   HENT: Positive for congestion, ear pain, postnasal drip, rhinorrhea, sinus pressure, sinus pain and sore throat. Negative for dental problem, facial swelling, nosebleeds, trouble swallowing and voice change.    Eyes: Negative for pain, discharge and visual disturbance.   Respiratory: Positive for cough and wheezing. Negative for apnea, chest tightness and shortness of breath.    Cardiovascular: Negative for chest pain and palpitations.   Gastrointestinal: Negative for abdominal pain, blood in stool, constipation and nausea.   Endocrine: Negative for cold intolerance, polydipsia and polyuria.   Genitourinary: Negative for difficulty urinating, enuresis and flank pain.   Musculoskeletal: Negative for arthralgias and back pain.   Skin: Negative for color change.   Allergic/Immunologic: Negative for environmental allergies and immunocompromised state.   Neurological: Negative for dizziness and light-headedness.   Hematological: Negative for adenopathy.   Psychiatric/Behavioral: Negative for agitation, behavioral problems, decreased concentration and dysphoric mood. The patient is not nervous/anxious.    All other systems reviewed and are negative.        Reviewed family, medical, surgical, and social history.    Objective:      /74 (BP Location: Left arm, Patient Position: Sitting, BP Method: Medium (Automatic))   Pulse 83   Temp 98.4 °F (36.9 °C)   Resp 18   Ht 5' 3" (1.6 m)   Wt 88.3 kg (194 lb 11.2 oz)   SpO2 97%   BMI 34.49 kg/m²   Physical Exam   Constitutional: She is oriented to person, place, and time. She appears well-developed and well-nourished. No distress.   HENT:   Head: " Normocephalic and atraumatic.   Right Ear: Tympanic membrane is bulging.   Nose: Nose normal.   Mouth/Throat: Oropharynx is clear and moist. No oropharyngeal exudate.   Eyes: Conjunctivae and EOM are normal. Pupils are equal, round, and reactive to light. No scleral icterus.   Neck: Normal range of motion. Neck supple. No thyromegaly present.   Cardiovascular: Normal rate, regular rhythm and normal heart sounds. Exam reveals no gallop and no friction rub.   No murmur heard.  Pulmonary/Chest: Effort normal and breath sounds normal. No respiratory distress. She has no wheezes. She has no rales. She exhibits no tenderness.   Abdominal: Soft. Bowel sounds are normal. She exhibits no distension. There is no tenderness. There is no guarding.   Musculoskeletal: Normal range of motion. She exhibits no edema, tenderness or deformity.   Lymphadenopathy:     She has no cervical adenopathy.   Neurological: She is alert and oriented to person, place, and time. She displays normal reflexes. No cranial nerve deficit or sensory deficit. She exhibits normal muscle tone.   Skin: Skin is warm and dry. No rash noted. She is not diaphoretic. No erythema. No pallor.   Psychiatric: She has a normal mood and affect. Her behavior is normal. Judgment and thought content normal.   Nursing note and vitals reviewed.      Assessment:       1. Otitis media, unspecified laterality, unspecified otitis media type        Plan:       Otitis media, unspecified laterality, unspecified otitis media type  -     dexamethasone injection 8 mg  -     azithromycin (Z-JL) 250 MG tablet; Take two tablets on day 1, then 1 tablet on days 2-5.  Dispense: 6 tablet; Refill: 0  -     predniSONE (DELTASONE) 20 MG tablet; Take 1 tablet (20 mg total) by mouth once daily. for 5 days  Dispense: 5 tablet; Refill: 0    Other orders  -     promethazine-dextromethorphan (PROMETHAZINE-DM) 6.25-15 mg/5 mL Syrp; Take 5 mLs by mouth every 6 (six) hours as needed (cough).   Dispense: 240 mL; Refill: 1            Risks, benefits, and side effects were discussed with the patient. All questions were answered to the fullest satisfaction of the patient, and pt verbalized understanding and agreement to treatment plan. Pt was to call with any new or worsening symptoms, or present to the ER.

## 2018-12-07 ENCOUNTER — TELEPHONE (OUTPATIENT)
Dept: FAMILY MEDICINE | Facility: CLINIC | Age: 48
End: 2018-12-07

## 2018-12-07 NOTE — TELEPHONE ENCOUNTER
----- Message from Greer Escobar sent at 12/7/2018  9:35 AM CST -----  Contact: self   Type:  Same Day Appointment Request    Caller is requesting a same day appointment.  Caller declined first available appointment listed below.      Name of Caller: patient  When is the first available appointment? January   Symptoms: Muscle weakness, lower back pain   Best Call Back Number: [phone]

## 2018-12-07 NOTE — TELEPHONE ENCOUNTER
----- Message from Greer Escobar sent at 12/7/2018  9:35 AM CST -----  Contact: self   Type:  Same Day Appointment Request    Caller is requesting a same day appointment.  Caller declined first available appointment listed below.      Name of Caller: patient  When is the first available appointment? January   Symptoms: Muscle weakness, lower back pain   Best Call Back Number: [phone]

## 2018-12-10 ENCOUNTER — OFFICE VISIT (OUTPATIENT)
Dept: FAMILY MEDICINE | Facility: CLINIC | Age: 48
End: 2018-12-10
Payer: MEDICAID

## 2018-12-10 ENCOUNTER — LAB VISIT (OUTPATIENT)
Dept: LAB | Facility: HOSPITAL | Age: 48
End: 2018-12-10
Attending: NURSE PRACTITIONER
Payer: MEDICAID

## 2018-12-10 VITALS
WEIGHT: 190 LBS | BODY MASS INDEX: 33.66 KG/M2 | DIASTOLIC BLOOD PRESSURE: 54 MMHG | HEART RATE: 74 BPM | HEIGHT: 63 IN | TEMPERATURE: 97 F | SYSTOLIC BLOOD PRESSURE: 127 MMHG

## 2018-12-10 DIAGNOSIS — M62.81 MUSCLE WEAKNESS (GENERALIZED): Primary | ICD-10-CM

## 2018-12-10 DIAGNOSIS — M62.81 MUSCLE WEAKNESS (GENERALIZED): ICD-10-CM

## 2018-12-10 LAB
ALBUMIN SERPL BCP-MCNC: 4.4 G/DL
ALP SERPL-CCNC: 59 U/L
ALT SERPL W/O P-5'-P-CCNC: 30 U/L
ANION GAP SERPL CALC-SCNC: 10 MMOL/L
AST SERPL-CCNC: 31 U/L
BILIRUB SERPL-MCNC: 0.7 MG/DL
BUN SERPL-MCNC: 13 MG/DL
CALCIUM SERPL-MCNC: 9.6 MG/DL
CHLORIDE SERPL-SCNC: 106 MMOL/L
CO2 SERPL-SCNC: 24 MMOL/L
CREAT SERPL-MCNC: 0.6 MG/DL
EST. GFR  (AFRICAN AMERICAN): >60 ML/MIN/1.73 M^2
EST. GFR  (NON AFRICAN AMERICAN): >60 ML/MIN/1.73 M^2
GLUCOSE SERPL-MCNC: 92 MG/DL
MAGNESIUM SERPL-MCNC: 2 MG/DL
POTASSIUM SERPL-SCNC: 4 MMOL/L
PROT SERPL-MCNC: 7.5 G/DL
SODIUM SERPL-SCNC: 140 MMOL/L
T4 FREE SERPL-MCNC: 1.58 NG/DL
TSH SERPL DL<=0.005 MIU/L-ACNC: 0.14 UIU/ML

## 2018-12-10 PROCEDURE — 83735 ASSAY OF MAGNESIUM: CPT

## 2018-12-10 PROCEDURE — 36415 COLL VENOUS BLD VENIPUNCTURE: CPT

## 2018-12-10 PROCEDURE — 80053 COMPREHEN METABOLIC PANEL: CPT

## 2018-12-10 PROCEDURE — 99999 PR PBB SHADOW E&M-EST. PATIENT-LVL III: CPT | Mod: PBBFAC,,, | Performed by: NURSE PRACTITIONER

## 2018-12-10 PROCEDURE — 84443 ASSAY THYROID STIM HORMONE: CPT

## 2018-12-10 PROCEDURE — 99213 OFFICE O/P EST LOW 20 MIN: CPT | Mod: S$PBB,,, | Performed by: NURSE PRACTITIONER

## 2018-12-10 PROCEDURE — 82306 VITAMIN D 25 HYDROXY: CPT

## 2018-12-10 PROCEDURE — 84439 ASSAY OF FREE THYROXINE: CPT

## 2018-12-10 PROCEDURE — 99213 OFFICE O/P EST LOW 20 MIN: CPT | Mod: PBBFAC,PN | Performed by: NURSE PRACTITIONER

## 2018-12-10 NOTE — PROGRESS NOTES
"Chief Complaint  Chief Complaint   Patient presents with    Back Pain    muscle weakness       HPI:  Jazmine Causey is a 47 y.o. female with medical diagnoses as listed and reviewed within the medical history and problem list that presents for complaints of generalized muscle weakness. She reports that symptoms started about a week ago. She feels like her legs will give out when she stands. She does not have unilateral weakness. No slurred speech. No headaches. She reports that the weakness is in bilateral arms and legs and she is having muscle spasms and cramping at night. She was on Requip but she stopped this 2 weeks ago due to worsening "leg movement" at night. She denies chest pain or SOB. She has not received the flu vaccine or taken any new medications. She reports that she has been taking her prescriptions as directed without any problems.     PAST MEDICAL HISTORY:  Past Medical History:   Diagnosis Date    GERD (gastroesophageal reflux disease)     Hyperlipidemia        PAST SURGICAL HISTORY:  Past Surgical History:   Procedure Laterality Date    ENDOMETRIAL ABLATION      TUBAL LIGATION         SOCIAL HISTORY:  Social History     Socioeconomic History    Marital status: Single     Spouse name: Not on file    Number of children: Not on file    Years of education: Not on file    Highest education level: Not on file   Social Needs    Financial resource strain: Not on file    Food insecurity - worry: Not on file    Food insecurity - inability: Not on file    Transportation needs - medical: Not on file    Transportation needs - non-medical: Not on file   Occupational History    Not on file   Tobacco Use    Smoking status: Current Every Day Smoker     Packs/day: 1.00    Smokeless tobacco: Never Used   Substance and Sexual Activity    Alcohol use: No    Drug use: Not on file    Sexual activity: Not on file   Other Topics Concern    Not on file   Social History Narrative    Not on file "       FAMILY HISTORY:  Family History   Problem Relation Age of Onset    Breast cancer Maternal Grandmother     Breast cancer Maternal Cousin        ALLERGIES AND MEDICATIONS: updated and reviewed.  Review of patient's allergies indicates:   Allergen Reactions    Cephalexin      Current Outpatient Medications   Medication Sig Dispense Refill    ibuprofen (ADVIL,MOTRIN) 800 MG tablet ibuprofen 800 mg tablet      levothyroxine (SYNTHROID) 125 MCG tablet Take 1 tablet (125 mcg total) by mouth before breakfast. 30 tablet 2    zolpidem (AMBIEN) 10 mg Tab Take 1 tablet (10 mg total) by mouth every evening. 30 tablet 5    albuterol 90 mcg/actuation inhaler Inhale 1-2 puffs into the lungs every 6 (six) hours as needed for Wheezing. Rescue 1 Inhaler 0    ALPRAZolam (XANAX) 0.5 MG tablet alprazolam 0.5 mg tablet   take 1 to 2 tablets if needed 30 MINIUTES BEFORE MRI      clotrimazole-betamethasone 1-0.05% (LOTRISONE) cream Apply topically 2 (two) times daily. 15 g 2    lovastatin (MEVACOR) 10 MG tablet       naproxen (NAPROSYN) 500 MG tablet       ondansetron (ZOFRAN) 4 MG tablet ondansetron HCl 4 mg tablet   take 1 tablet by mouth every 8 hours if needed      polymyxin B sulf-trimethoprim (POLYTRIM) 10,000 unit- 1 mg/mL Drop Place 1 drop into the right eye every 6 (six) hours. 10 mL 1    promethazine-dextromethorphan (PROMETHAZINE-DM) 6.25-15 mg/5 mL Syrp Take 5 mLs by mouth every 6 (six) hours as needed (cough). 240 mL 1    rOPINIRole (REQUIP) 2 MG tablet Take 1 tablet (2 mg total) by mouth every evening. 30 tablet 2    tiZANidine (ZANAFLEX) 4 MG tablet tizanidine 4 mg tablet   take 1 tablet by mouth every 8 hours if needed       No current facility-administered medications for this visit.          ROS  Review of Systems   Constitutional: Positive for fatigue. Negative for appetite change, chills and fever.   HENT: Negative for congestion, postnasal drip, rhinorrhea and sore throat.    Respiratory: Negative  "for cough, chest tightness, shortness of breath and wheezing.    Cardiovascular: Negative for chest pain and palpitations.   Gastrointestinal: Negative for abdominal distention, abdominal pain, constipation, diarrhea, nausea and vomiting.   Genitourinary: Negative for dysuria.   Musculoskeletal: Positive for myalgias.   Skin: Negative for color change and rash.   Neurological: Positive for weakness. Negative for dizziness and headaches.   Psychiatric/Behavioral: Negative for confusion and sleep disturbance. The patient is not nervous/anxious.            PHYSICAL EXAM  Vitals:    12/10/18 0815   BP: (!) 127/54   BP Location: Left arm   Patient Position: Sitting   BP Method: Medium (Automatic)   Pulse: 74   Temp: 96.5 °F (35.8 °C)   TempSrc: Tympanic   Weight: 86.2 kg (190 lb)   Height: 5' 3" (1.6 m)    Body mass index is 33.66 kg/m².  Weight: 86.2 kg (190 lb)   Height: 5' 3" (160 cm)       Physical Exam   Constitutional: She is oriented to person, place, and time. She appears well-developed and well-nourished.   obese   HENT:   Head: Normocephalic.   Eyes: Pupils are equal, round, and reactive to light.   Neck: Normal range of motion. Neck supple.   Cardiovascular: Normal rate, regular rhythm, S1 normal and S2 normal.   No murmur heard.  Pulmonary/Chest: Effort normal and breath sounds normal. She has no wheezes.   Abdominal: Soft. Normal appearance and bowel sounds are normal. She exhibits no distension. There is no tenderness.   Musculoskeletal:   Generalized weakness to arms and legs. Pt gait is slow and appears as if she is unable to control movement completely.    Neurological: She is alert and oriented to person, place, and time. A cranial nerve deficit is present. Coordination abnormal.   Skin: Skin is warm and dry. Capillary refill takes less than 2 seconds.   Psychiatric: She has a normal mood and affect. Her speech is normal and behavior is normal. Thought content normal. Cognition and memory are normal. "   Vitals reviewed.        Health Maintenance       Date Due Completion Date    TETANUS VACCINE 12/12/1988 ---    Pneumococcal Vaccine (Medium Risk) (1 of 1 - PPSV23) 12/12/1989 ---    Pap Smear with HPV Cotest 12/12/1991 ---    Influenza Vaccine 08/01/2018 ---    Mammogram 09/18/2020 9/18/2018    Lipid Panel 10/12/2023 10/12/2018               Assessment & Plan    Jazmine was seen today for back pain and muscle weakness.    Diagnoses and all orders for this visit:    Muscle weakness (generalized)  -     Vitamin D; Future  -     Comprehensive metabolic panel; Future  -     TSH; Future  -     Magnesium; Future    - Spoke with  about plan of care. Will call patient soon as labs are resulted to follow up.     Follow-up: Follow-up in about 2 weeks (around 12/24/2018).      Risks, benefits, and side effects were discussed with the patient. All questions were answered to the fullest satisfaction of the patient, and pt verbalized understanding and agreement to treatment plan. Pt was to call with any new or worsening symptoms, or present to the ER.

## 2018-12-11 ENCOUNTER — TELEPHONE (OUTPATIENT)
Dept: FAMILY MEDICINE | Facility: CLINIC | Age: 48
End: 2018-12-11

## 2018-12-11 DIAGNOSIS — E03.9 HYPOTHYROIDISM, UNSPECIFIED TYPE: Primary | ICD-10-CM

## 2018-12-11 DIAGNOSIS — E55.9 VITAMIN D DEFICIENCY: ICD-10-CM

## 2018-12-11 LAB — 25(OH)D3+25(OH)D2 SERPL-MCNC: 17 NG/ML

## 2018-12-11 RX ORDER — ERGOCALCIFEROL 1.25 MG/1
50000 CAPSULE ORAL
Qty: 8 CAPSULE | Refills: 0 | Status: SHIPPED | OUTPATIENT
Start: 2018-12-11 | End: 2019-01-30

## 2018-12-11 RX ORDER — LEVOTHYROXINE SODIUM 112 UG/1
112 TABLET ORAL DAILY
Qty: 30 TABLET | Refills: 11 | Status: SHIPPED | OUTPATIENT
Start: 2018-12-11 | End: 2019-12-31 | Stop reason: SDUPTHER

## 2019-01-30 ENCOUNTER — LAB VISIT (OUTPATIENT)
Dept: LAB | Facility: HOSPITAL | Age: 49
End: 2019-01-30
Attending: FAMILY MEDICINE
Payer: MEDICAID

## 2019-01-30 DIAGNOSIS — E03.9 HYPOTHYROIDISM, UNSPECIFIED TYPE: ICD-10-CM

## 2019-01-30 DIAGNOSIS — E55.9 VITAMIN D DEFICIENCY: ICD-10-CM

## 2019-01-30 LAB
25(OH)D3+25(OH)D2 SERPL-MCNC: 22 NG/ML
ALBUMIN SERPL BCP-MCNC: 4.2 G/DL
ALP SERPL-CCNC: 54 U/L
ALT SERPL W/O P-5'-P-CCNC: 23 U/L
ANION GAP SERPL CALC-SCNC: 9 MMOL/L
AST SERPL-CCNC: 25 U/L
BASOPHILS # BLD AUTO: 0.11 K/UL
BASOPHILS NFR BLD: 1.4 %
BILIRUB SERPL-MCNC: 0.5 MG/DL
BUN SERPL-MCNC: 13 MG/DL
CALCIUM SERPL-MCNC: 9.2 MG/DL
CHLORIDE SERPL-SCNC: 105 MMOL/L
CO2 SERPL-SCNC: 23 MMOL/L
CREAT SERPL-MCNC: 0.6 MG/DL
DIFFERENTIAL METHOD: NORMAL
EOSINOPHIL # BLD AUTO: 0.2 K/UL
EOSINOPHIL NFR BLD: 2.4 %
ERYTHROCYTE [DISTWIDTH] IN BLOOD BY AUTOMATED COUNT: 11.9 %
EST. GFR  (AFRICAN AMERICAN): >60 ML/MIN/1.73 M^2
EST. GFR  (NON AFRICAN AMERICAN): >60 ML/MIN/1.73 M^2
GLUCOSE SERPL-MCNC: 98 MG/DL
HCT VFR BLD AUTO: 42.4 %
HGB BLD-MCNC: 14.4 G/DL
IMM GRANULOCYTES # BLD AUTO: 0.03 K/UL
IMM GRANULOCYTES NFR BLD AUTO: 0.4 %
LYMPHOCYTES # BLD AUTO: 2.8 K/UL
LYMPHOCYTES NFR BLD: 35.3 %
MCH RBC QN AUTO: 29.7 PG
MCHC RBC AUTO-ENTMCNC: 34 G/DL
MCV RBC AUTO: 87 FL
MONOCYTES # BLD AUTO: 0.4 K/UL
MONOCYTES NFR BLD: 4.6 %
NEUTROPHILS # BLD AUTO: 4.5 K/UL
NEUTROPHILS NFR BLD: 55.9 %
NRBC BLD-RTO: 0 /100 WBC
PLATELET # BLD AUTO: 265 K/UL
PMV BLD AUTO: 10.5 FL
POTASSIUM SERPL-SCNC: 4 MMOL/L
PROT SERPL-MCNC: 7.4 G/DL
RBC # BLD AUTO: 4.85 M/UL
SODIUM SERPL-SCNC: 137 MMOL/L
TSH SERPL DL<=0.005 MIU/L-ACNC: 0.59 UIU/ML
WBC # BLD AUTO: 7.97 K/UL

## 2019-01-30 PROCEDURE — 80053 COMPREHEN METABOLIC PANEL: CPT

## 2019-01-30 PROCEDURE — 84443 ASSAY THYROID STIM HORMONE: CPT

## 2019-01-30 PROCEDURE — 82306 VITAMIN D 25 HYDROXY: CPT

## 2019-01-30 PROCEDURE — 36415 COLL VENOUS BLD VENIPUNCTURE: CPT

## 2019-01-30 PROCEDURE — 85025 COMPLETE CBC W/AUTO DIFF WBC: CPT

## 2019-02-04 ENCOUNTER — HOSPITAL ENCOUNTER (OUTPATIENT)
Dept: RADIOLOGY | Facility: HOSPITAL | Age: 49
Discharge: HOME | End: 2019-02-04
Attending: NURSE PRACTITIONER
Payer: MEDICAID

## 2019-02-04 ENCOUNTER — OFFICE VISIT (OUTPATIENT)
Dept: FAMILY MEDICINE | Facility: CLINIC | Age: 49
End: 2019-02-04
Payer: MEDICAID

## 2019-02-04 VITALS
TEMPERATURE: 98 F | HEIGHT: 63 IN | HEART RATE: 67 BPM | SYSTOLIC BLOOD PRESSURE: 114 MMHG | BODY MASS INDEX: 33.31 KG/M2 | DIASTOLIC BLOOD PRESSURE: 72 MMHG | OXYGEN SATURATION: 95 % | WEIGHT: 188 LBS | RESPIRATION RATE: 18 BRPM

## 2019-02-04 DIAGNOSIS — M54.50 MIDLINE LOW BACK PAIN WITHOUT SCIATICA, UNSPECIFIED CHRONICITY: ICD-10-CM

## 2019-02-04 DIAGNOSIS — E03.9 HYPOTHYROIDISM, UNSPECIFIED TYPE: ICD-10-CM

## 2019-02-04 DIAGNOSIS — E78.5 HYPERLIPIDEMIA, UNSPECIFIED HYPERLIPIDEMIA TYPE: ICD-10-CM

## 2019-02-04 DIAGNOSIS — E55.9 VITAMIN D DEFICIENCY: Primary | ICD-10-CM

## 2019-02-04 PROCEDURE — 99214 PR OFFICE/OUTPT VISIT, EST, LEVL IV, 30-39 MIN: ICD-10-PCS | Mod: S$PBB,,, | Performed by: NURSE PRACTITIONER

## 2019-02-04 PROCEDURE — 99214 OFFICE O/P EST MOD 30 MIN: CPT | Mod: PBBFAC,PN | Performed by: NURSE PRACTITIONER

## 2019-02-04 PROCEDURE — 99999 PR PBB SHADOW E&M-EST. PATIENT-LVL IV: ICD-10-PCS | Mod: PBBFAC,,, | Performed by: NURSE PRACTITIONER

## 2019-02-04 PROCEDURE — 72110 X-RAY EXAM L-2 SPINE 4/>VWS: CPT | Mod: TC,FY

## 2019-02-04 PROCEDURE — 99999 PR PBB SHADOW E&M-EST. PATIENT-LVL IV: CPT | Mod: PBBFAC,,, | Performed by: NURSE PRACTITIONER

## 2019-02-04 PROCEDURE — 72110 X-RAY EXAM L-2 SPINE 4/>VWS: CPT | Mod: 26,,, | Performed by: RADIOLOGY

## 2019-02-04 PROCEDURE — 72110 XR LUMBAR SPINE COMPLETE 5 VIEW: ICD-10-PCS | Mod: 26,,, | Performed by: RADIOLOGY

## 2019-02-04 PROCEDURE — 99214 OFFICE O/P EST MOD 30 MIN: CPT | Mod: S$PBB,,, | Performed by: NURSE PRACTITIONER

## 2019-02-04 RX ORDER — ERGOCALCIFEROL 1.25 MG/1
50000 CAPSULE ORAL
Qty: 24 CAPSULE | Refills: 0 | Status: SHIPPED | OUTPATIENT
Start: 2019-02-04 | End: 2019-04-08 | Stop reason: SDUPTHER

## 2019-02-04 NOTE — PROGRESS NOTES
Chief Complaint  Chief Complaint   Patient presents with    Follow-up       HPI:  Jazmine Causey is a 48 y.o. female with medical diagnoses as listed and reviewed within the medical history and problem list that presents for a lab follow up. Pt reports that she is still not willing to take medication for her cholesterol. I educated her on risks, ability to change medication, and problems resulting from hyperlipidemia. Pt still reports leg cramping at times but better. She is reporting lower back pain that is not a result of a fall or trauma. She denies weakness or loss of continence. She is ambulatory.      PAST MEDICAL HISTORY:  Past Medical History:   Diagnosis Date    GERD (gastroesophageal reflux disease)     Hyperlipidemia        PAST SURGICAL HISTORY:  Past Surgical History:   Procedure Laterality Date    ENDOMETRIAL ABLATION      TUBAL LIGATION         SOCIAL HISTORY:  Social History     Socioeconomic History    Marital status: Single     Spouse name: Not on file    Number of children: Not on file    Years of education: Not on file    Highest education level: Not on file   Social Needs    Financial resource strain: Not on file    Food insecurity - worry: Not on file    Food insecurity - inability: Not on file    Transportation needs - medical: Not on file    Transportation needs - non-medical: Not on file   Occupational History    Not on file   Tobacco Use    Smoking status: Current Every Day Smoker     Packs/day: 1.00    Smokeless tobacco: Never Used   Substance and Sexual Activity    Alcohol use: No    Drug use: Not on file    Sexual activity: Not on file   Other Topics Concern    Not on file   Social History Narrative    Not on file       FAMILY HISTORY:  Family History   Problem Relation Age of Onset    Breast cancer Maternal Grandmother     Breast cancer Maternal Cousin        ALLERGIES AND MEDICATIONS: updated and reviewed.  Review of patient's allergies indicates:   Allergen  Reactions    Cephalexin      Current Outpatient Medications   Medication Sig Dispense Refill    albuterol 90 mcg/actuation inhaler Inhale 1-2 puffs into the lungs every 6 (six) hours as needed for Wheezing. Rescue 1 Inhaler 0    ALPRAZolam (XANAX) 0.5 MG tablet alprazolam 0.5 mg tablet   take 1 to 2 tablets if needed 30 MINIUTES BEFORE MRI      clotrimazole-betamethasone 1-0.05% (LOTRISONE) cream Apply topically 2 (two) times daily. 15 g 2    ergocalciferol (ERGOCALCIFEROL) 50,000 unit Cap Take 1 capsule (50,000 Units total) by mouth twice a week. 24 capsule 0    ibuprofen (ADVIL,MOTRIN) 800 MG tablet ibuprofen 800 mg tablet      levothyroxine (SYNTHROID) 112 MCG tablet Take 1 tablet (112 mcg total) by mouth once daily. 30 tablet 11    lovastatin (MEVACOR) 10 MG tablet       naproxen (NAPROSYN) 500 MG tablet       ondansetron (ZOFRAN) 4 MG tablet ondansetron HCl 4 mg tablet   take 1 tablet by mouth every 8 hours if needed      polymyxin B sulf-trimethoprim (POLYTRIM) 10,000 unit- 1 mg/mL Drop Place 1 drop into the right eye every 6 (six) hours. 10 mL 1    promethazine-dextromethorphan (PROMETHAZINE-DM) 6.25-15 mg/5 mL Syrp Take 5 mLs by mouth every 6 (six) hours as needed (cough). 240 mL 1    rOPINIRole (REQUIP) 2 MG tablet Take 1 tablet (2 mg total) by mouth every evening. 30 tablet 2    tiZANidine (ZANAFLEX) 4 MG tablet tizanidine 4 mg tablet   take 1 tablet by mouth every 8 hours if needed      zolpidem (AMBIEN) 10 mg Tab Take 1 tablet (10 mg total) by mouth every evening. 30 tablet 5     No current facility-administered medications for this visit.          ROS  Review of Systems   Constitutional: Negative for appetite change, chills, fatigue and fever.   HENT: Negative for congestion, postnasal drip, rhinorrhea and sore throat.    Respiratory: Negative for cough, chest tightness, shortness of breath and wheezing.    Cardiovascular: Negative for chest pain and palpitations.   Gastrointestinal:  "Negative for abdominal distention, abdominal pain, constipation, diarrhea, nausea and vomiting.   Genitourinary: Negative for dysuria.   Musculoskeletal: Positive for arthralgias, back pain and myalgias.   Skin: Negative for color change and rash.   Neurological: Negative for dizziness, weakness and headaches.   Psychiatric/Behavioral: Negative for confusion and sleep disturbance. The patient is not nervous/anxious.            PHYSICAL EXAM  Vitals:    02/04/19 0801   BP: 114/72   BP Location: Left arm   Patient Position: Sitting   Pulse: 67   Resp: 18   Temp: 97.9 °F (36.6 °C)   SpO2: 95%   Weight: 85.3 kg (188 lb)   Height: 5' 3" (1.6 m)    Body mass index is 33.3 kg/m².  Weight: 85.3 kg (188 lb)   Height: 5' 3" (160 cm)       Physical Exam   Constitutional: She is oriented to person, place, and time. She appears well-developed and well-nourished.   HENT:   Head: Normocephalic.   Eyes: Pupils are equal, round, and reactive to light.   Neck: Normal range of motion. Neck supple.   Cardiovascular: Normal rate, regular rhythm, S1 normal and S2 normal.   No murmur heard.  Pulmonary/Chest: Effort normal and breath sounds normal. She has no wheezes.   Abdominal: Soft. Normal appearance and bowel sounds are normal. She exhibits no distension. There is no tenderness.   Musculoskeletal: Normal range of motion.   Neurological: She is alert and oriented to person, place, and time.   Skin: Skin is warm and dry. Capillary refill takes less than 2 seconds.   Psychiatric: She has a normal mood and affect. Her speech is normal and behavior is normal. Thought content normal. Cognition and memory are normal.   Vitals reviewed.        Health Maintenance       Date Due Completion Date    TETANUS VACCINE 12/12/1988 ---    Pneumococcal Vaccine (Medium Risk) (1 of 1 - PPSV23) 12/12/1989 ---    Pap Smear with HPV Cotest 12/12/1991 ---    Influenza Vaccine 08/01/2018 ---    Mammogram 09/18/2020 9/18/2018    Lipid Panel 10/12/2023 " 10/12/2018               Assessment & Plan    Jazmine was seen today for follow-up.    Diagnoses and all orders for this visit:    Vitamin D deficiency  -     ergocalciferol (ERGOCALCIFEROL) 50,000 unit Cap; Take 1 capsule (50,000 Units total) by mouth twice a week.  -     Vitamin D; Future    Midline low back pain without sciatica, unspecified chronicity  -     X-Ray Lumbar Spine AP And Lateral; Future    Hyperlipidemia, unspecified hyperlipidemia type  -     CBC auto differential; Future  -     Comprehensive metabolic panel; Future  -     Hemoglobin A1c; Future  -     Lipid panel; Future    Hypothyroidism, unspecified type  -     TSH; Future    - pt instructed to take a supplemental calcium. Once she has completed the Vitamin D therapy, she will add vitamin D to this supplement.   - She was educated on hyperlipidemia and need to control with diet and exercise more closely since she refuses the medication. Pt instructed to take fish oil or krill oil 4 times a day. She verbalizes understanding of the risks involved in her current condition. Will repeat labs in 3 months.     Follow-up: Follow-up in about 3 months (around 5/4/2019).      Risks, benefits, and side effects were discussed with the patient. All questions were answered to the fullest satisfaction of the patient, and pt verbalized understanding and agreement to treatment plan. Pt was to call with any new or worsening symptoms, or present to the ER.

## 2019-02-05 ENCOUNTER — TELEPHONE (OUTPATIENT)
Dept: FAMILY MEDICINE | Facility: CLINIC | Age: 49
End: 2019-02-05

## 2019-02-05 DIAGNOSIS — S22.000A CLOSED COMPRESSION FRACTURE OF THORACIC VERTEBRA, INITIAL ENCOUNTER: Primary | ICD-10-CM

## 2019-02-05 DIAGNOSIS — M48.54XA NON-TRAUMATIC COMPRESSION FRACTURE OF TENTH THORACIC VERTEBRA, INITIAL ENCOUNTER: Primary | ICD-10-CM

## 2019-02-05 NOTE — TELEPHONE ENCOUNTER
----- Message from Marion Chadwick NP sent at 2/5/2019  7:50 AM CST -----  Please let patient know that her x-ray shows mild compression fractures of the lower part of her spine. It appears that it has been there for a while. Nothing immediately concerning. However, she needs to see a back specialist. I have put in the referral for  and she needs to make an appointment. Thanks!

## 2019-02-05 NOTE — TELEPHONE ENCOUNTER
I have spoken with pt with x-ray results and informed of referral to [facility].  Pt verbalized understanding.  Marcie

## 2019-02-07 DIAGNOSIS — S22.000A THORACIC COMPRESSION FRACTURE, CLOSED, INITIAL ENCOUNTER: Primary | ICD-10-CM

## 2019-02-13 LAB
HUMAN PAPILLOMAVIRUS (HPV): NORMAL
HUMAN PAPILLOMAVIRUS (HPV): NORMAL

## 2019-02-18 RX ORDER — ATORVASTATIN CALCIUM 20 MG/1
20 TABLET, FILM COATED ORAL DAILY
Qty: 90 TABLET | Refills: 3 | Status: SHIPPED | OUTPATIENT
Start: 2019-02-18 | End: 2019-09-30

## 2019-03-26 ENCOUNTER — TELEPHONE (OUTPATIENT)
Dept: FAMILY MEDICINE | Facility: CLINIC | Age: 49
End: 2019-03-26

## 2019-03-26 NOTE — TELEPHONE ENCOUNTER
----- Message from Unique Abdalla LPN sent at 3/25/2019  3:10 PM CDT -----      ----- Message -----  From: Odilia Mayorga LPN  Sent: 3/25/2019   2:59 PM  To: Unique Abdalla LPN      ----- Message -----  From: Libia Ibanez NP  Sent: 3/25/2019   2:56 PM  To: Shamar Reza Staff    Patient saw Marion last month and is wanting to know status of referrals

## 2019-03-26 NOTE — TELEPHONE ENCOUNTER
Called patient about referrals, tried to call the office, will have to call back in the morning. Will call the patient when I find out if they got the refferrals and when the patient can be seen.

## 2019-03-27 ENCOUNTER — TELEPHONE (OUTPATIENT)
Dept: FAMILY MEDICINE | Facility: CLINIC | Age: 49
End: 2019-03-27

## 2019-03-27 NOTE — TELEPHONE ENCOUNTER
Called [facility] her referral was denied. Dr. Corrales' office I had to leave a vm for them to call back and verify if the referral was sent.

## 2019-04-02 ENCOUNTER — TELEPHONE (OUTPATIENT)
Dept: FAMILY MEDICINE | Facility: CLINIC | Age: 49
End: 2019-04-02

## 2019-04-02 NOTE — TELEPHONE ENCOUNTER
----- Message from Unique Abdalla LPN sent at 4/2/2019  3:05 PM CDT -----  Contact: patient      ----- Message -----  From: Lurdes Vick  Sent: 4/2/2019   2:59 PM  To: Farrukh Schultz Staff    Type: Needs Medical Advice    Who Called:  patient  Symptoms (please be specific):  Spine injury  How long has patient had these symptoms:  Since february   Pharmacy name and phone #:    Best Call Back Number: [phone]  Additional Information: patient states she is waiting on a ref to see a neurosurgeon . Please call to advise. Thanks!

## 2019-04-02 NOTE — TELEPHONE ENCOUNTER
Spoke with the patient. [facility] denied her referral. Will call the patient when we can find someone that accepts her insurance. She wants to see someone in [place] or [place].

## 2019-04-05 ENCOUNTER — TELEPHONE (OUTPATIENT)
Dept: FAMILY MEDICINE | Facility: CLINIC | Age: 49
End: 2019-04-05

## 2019-04-05 DIAGNOSIS — S22.000A THORACIC COMPRESSION FRACTURE, CLOSED, INITIAL ENCOUNTER: Primary | ICD-10-CM

## 2019-04-05 NOTE — TELEPHONE ENCOUNTER
Faxed  referral to Dr. Sargent neurosurgeon this morning. Verified provider takes patient's insurance. Faxed to [phone]. Called patient to let her know referral was sent and where. Also gave patient their phone number [phone].

## 2019-04-08 DIAGNOSIS — E55.9 VITAMIN D DEFICIENCY: ICD-10-CM

## 2019-04-08 RX ORDER — ERGOCALCIFEROL 1.25 MG/1
50000 CAPSULE ORAL
Qty: 24 CAPSULE | Refills: 0 | Status: SHIPPED | OUTPATIENT
Start: 2019-04-08 | End: 2020-03-04 | Stop reason: SDUPTHER

## 2019-09-11 ENCOUNTER — PATIENT OUTREACH (OUTPATIENT)
Dept: ADMINISTRATIVE | Facility: HOSPITAL | Age: 49
End: 2019-09-11

## 2019-09-11 NOTE — LETTER
September 11, 2019    Jazmine Causey  [id number]  [place] [address]             [facility]  [address]  [facility] [address]  Phone: [phone] Dear Michelle [facility] is committed to your overall health and would like to ensure that you are up to date on your recommended test and/or procedures.   Francisco Javier Cloud MD  has found that your chart shows you may be due for the following:    PAP SMEAR    If you have had any of the above done at another facility, please let us know so that we may obtain copies from that facility.  If you have a copy of these records, please provide a copy for us to scan into your chart.  You are welcome to request that the report be faxed to us at  ([phone]).     Otherwise, please schedule these appointments at your earliest convenience by calling [phone] or going to [email or web address].    If you have an upcoming scheduled appointment for the item above, please disregard this letter.    Sincerely,  Your [facility]  MD Keke Costa L.P.N. Clinical Care Coordinator  [address] [place] [address] [phone]

## 2019-09-23 RX ORDER — ZOLPIDEM TARTRATE 10 MG/1
10 TABLET ORAL NIGHTLY
Qty: 30 TABLET | Refills: 5 | Status: SHIPPED | OUTPATIENT
Start: 2019-09-23 | End: 2020-03-04 | Stop reason: SDUPTHER

## 2019-09-23 NOTE — TELEPHONE ENCOUNTER
Pt has appt 9.27.19.  Please advise, thank you!        ----- Message from Diandra Crane sent at 9/23/2019 10:46 AM CDT -----  Contact: patient  Type:  RX Refill Request    Who Called:  patient  Refill or New Rx:  refill  RX Name and Strength:   zolpidem (AMBIEN) 10 mg Tab  How is the patient currently taking it? (ex. 1XDay):   1 every evening  Is this a 30 day or 90 day RX:  30  Preferred Pharmacy with phone number:    [id number] - [place], [place] - [address] [address] AT [facility] [address]   [address] [address] [place] [address]  Phone: [phone] Fax: [phone]  Local or Mail Order:  local  Ordering Provider:  Pedro Luis Soria Call Back Number:  [phone]  Additional Information:  n/a

## 2019-09-27 ENCOUNTER — LAB VISIT (OUTPATIENT)
Dept: LAB | Facility: HOSPITAL | Age: 49
End: 2019-09-27
Attending: FAMILY MEDICINE
Payer: MEDICAID

## 2019-09-27 ENCOUNTER — OFFICE VISIT (OUTPATIENT)
Dept: FAMILY MEDICINE | Facility: CLINIC | Age: 49
End: 2019-09-27
Payer: MEDICAID

## 2019-09-27 ENCOUNTER — TELEPHONE (OUTPATIENT)
Dept: FAMILY MEDICINE | Facility: CLINIC | Age: 49
End: 2019-09-27

## 2019-09-27 VITALS
OXYGEN SATURATION: 98 % | BODY MASS INDEX: 33.28 KG/M2 | HEIGHT: 63 IN | RESPIRATION RATE: 16 BRPM | SYSTOLIC BLOOD PRESSURE: 129 MMHG | DIASTOLIC BLOOD PRESSURE: 81 MMHG | HEART RATE: 76 BPM | WEIGHT: 187.81 LBS

## 2019-09-27 DIAGNOSIS — E78.5 HYPERLIPIDEMIA, UNSPECIFIED HYPERLIPIDEMIA TYPE: ICD-10-CM

## 2019-09-27 DIAGNOSIS — E78.5 HYPERLIPIDEMIA, UNSPECIFIED HYPERLIPIDEMIA TYPE: Primary | ICD-10-CM

## 2019-09-27 DIAGNOSIS — S22.000A THORACIC COMPRESSION FRACTURE, CLOSED, INITIAL ENCOUNTER: ICD-10-CM

## 2019-09-27 DIAGNOSIS — E03.9 HYPOTHYROIDISM, UNSPECIFIED TYPE: ICD-10-CM

## 2019-09-27 LAB
ALBUMIN SERPL BCP-MCNC: 4.5 G/DL (ref 3.5–5.2)
ALP SERPL-CCNC: 57 U/L (ref 55–135)
ALT SERPL W/O P-5'-P-CCNC: 46 U/L (ref 10–44)
ANION GAP SERPL CALC-SCNC: 12 MMOL/L (ref 8–16)
AST SERPL-CCNC: 38 U/L (ref 10–40)
BASOPHILS # BLD AUTO: 0.05 K/UL (ref 0–0.2)
BASOPHILS NFR BLD: 0.7 % (ref 0–1.9)
BILIRUB SERPL-MCNC: 0.4 MG/DL (ref 0.1–1)
BUN SERPL-MCNC: 14 MG/DL (ref 6–20)
CALCIUM SERPL-MCNC: 9.8 MG/DL (ref 8.7–10.5)
CHLORIDE SERPL-SCNC: 102 MMOL/L (ref 95–110)
CHOLEST SERPL-MCNC: 269 MG/DL (ref 120–199)
CHOLEST/HDLC SERPL: 4.3 {RATIO} (ref 2–5)
CO2 SERPL-SCNC: 25 MMOL/L (ref 23–29)
CREAT SERPL-MCNC: 0.7 MG/DL (ref 0.5–1.4)
DIFFERENTIAL METHOD: NORMAL
EOSINOPHIL # BLD AUTO: 0.1 K/UL (ref 0–0.5)
EOSINOPHIL NFR BLD: 1.3 % (ref 0–8)
ERYTHROCYTE [DISTWIDTH] IN BLOOD BY AUTOMATED COUNT: 13.4 % (ref 11.5–14.5)
EST. GFR  (AFRICAN AMERICAN): >60 ML/MIN/1.73 M^2
EST. GFR  (NON AFRICAN AMERICAN): >60 ML/MIN/1.73 M^2
GLUCOSE SERPL-MCNC: 85 MG/DL (ref 70–110)
HCT VFR BLD AUTO: 39.6 % (ref 37–48.5)
HDLC SERPL-MCNC: 62 MG/DL (ref 40–75)
HDLC SERPL: 23 % (ref 20–50)
HGB BLD-MCNC: 12.8 G/DL (ref 12–16)
IMM GRANULOCYTES # BLD AUTO: 0.02 K/UL (ref 0–0.04)
IMM GRANULOCYTES NFR BLD AUTO: 0.3 % (ref 0–0.5)
LDLC SERPL CALC-MCNC: 183.2 MG/DL (ref 63–159)
LYMPHOCYTES # BLD AUTO: 2.3 K/UL (ref 1–4.8)
LYMPHOCYTES NFR BLD: 31.6 % (ref 18–48)
MCH RBC QN AUTO: 29 PG (ref 27–31)
MCHC RBC AUTO-ENTMCNC: 32.3 G/DL (ref 32–36)
MCV RBC AUTO: 90 FL (ref 82–98)
MONOCYTES # BLD AUTO: 0.4 K/UL (ref 0.3–1)
MONOCYTES NFR BLD: 5.3 % (ref 4–15)
NEUTROPHILS # BLD AUTO: 4.3 K/UL (ref 1.8–7.7)
NEUTROPHILS NFR BLD: 60.8 % (ref 38–73)
NONHDLC SERPL-MCNC: 207 MG/DL
NRBC BLD-RTO: 0 /100 WBC
PLATELET # BLD AUTO: 313 K/UL (ref 150–350)
PMV BLD AUTO: 9.2 FL (ref 9.2–12.9)
POTASSIUM SERPL-SCNC: 4.1 MMOL/L (ref 3.5–5.1)
PROT SERPL-MCNC: 8.3 G/DL (ref 6–8.4)
RBC # BLD AUTO: 4.41 M/UL (ref 4–5.4)
SODIUM SERPL-SCNC: 139 MMOL/L (ref 136–145)
T4 FREE SERPL-MCNC: 1.18 NG/DL (ref 0.71–1.51)
TRIGL SERPL-MCNC: 119 MG/DL (ref 30–150)
TSH SERPL DL<=0.005 MIU/L-ACNC: 4.91 UIU/ML (ref 0.34–5.6)
WBC # BLD AUTO: 7.13 K/UL (ref 3.9–12.7)

## 2019-09-27 PROCEDURE — 99214 OFFICE O/P EST MOD 30 MIN: CPT | Mod: S$PBB,,, | Performed by: FAMILY MEDICINE

## 2019-09-27 PROCEDURE — 99999 PR PBB SHADOW E&M-EST. PATIENT-LVL IV: CPT | Mod: PBBFAC,,, | Performed by: FAMILY MEDICINE

## 2019-09-27 PROCEDURE — 80053 COMPREHEN METABOLIC PANEL: CPT

## 2019-09-27 PROCEDURE — 80061 LIPID PANEL: CPT

## 2019-09-27 PROCEDURE — 84443 ASSAY THYROID STIM HORMONE: CPT

## 2019-09-27 PROCEDURE — 99214 PR OFFICE/OUTPT VISIT, EST, LEVL IV, 30-39 MIN: ICD-10-PCS | Mod: S$PBB,,, | Performed by: FAMILY MEDICINE

## 2019-09-27 PROCEDURE — 99214 OFFICE O/P EST MOD 30 MIN: CPT | Mod: PBBFAC,PN | Performed by: FAMILY MEDICINE

## 2019-09-27 PROCEDURE — 36415 COLL VENOUS BLD VENIPUNCTURE: CPT

## 2019-09-27 PROCEDURE — 84439 ASSAY OF FREE THYROXINE: CPT

## 2019-09-27 PROCEDURE — 85025 COMPLETE CBC W/AUTO DIFF WBC: CPT

## 2019-09-27 PROCEDURE — 99999 PR PBB SHADOW E&M-EST. PATIENT-LVL IV: ICD-10-PCS | Mod: PBBFAC,,, | Performed by: FAMILY MEDICINE

## 2019-09-27 RX ORDER — HYDROXYZINE HYDROCHLORIDE 25 MG/1
25 TABLET, FILM COATED ORAL 3 TIMES DAILY PRN
Qty: 30 TABLET | Refills: 0 | Status: SHIPPED | OUTPATIENT
Start: 2019-09-27 | End: 2020-03-04 | Stop reason: SDUPTHER

## 2019-09-27 RX ORDER — HYDROCODONE BITARTRATE AND ACETAMINOPHEN 5; 325 MG/1; MG/1
TABLET ORAL
Qty: 30 TABLET | Refills: 0 | Status: SHIPPED | OUTPATIENT
Start: 2019-09-27 | End: 2019-09-27

## 2019-09-27 RX ORDER — DOXYCYCLINE 100 MG/1
CAPSULE ORAL
Refills: 0 | COMMUNITY
Start: 2019-09-15 | End: 2021-03-31

## 2019-09-27 RX ORDER — SULFAMETHOXAZOLE AND TRIMETHOPRIM 800; 160 MG/1; MG/1
TABLET ORAL
Refills: 0 | COMMUNITY
Start: 2019-09-15 | End: 2021-03-31

## 2019-09-27 RX ORDER — PROMETHAZINE HYDROCHLORIDE 25 MG/1
25 TABLET ORAL EVERY 4 HOURS PRN
Qty: 30 TABLET | Refills: 0 | Status: SHIPPED | OUTPATIENT
Start: 2019-09-27 | End: 2022-05-24

## 2019-09-27 RX ORDER — HYDROCODONE BITARTRATE AND ACETAMINOPHEN 5; 325 MG/1; MG/1
TABLET ORAL
Refills: 0 | COMMUNITY
Start: 2019-09-15 | End: 2019-09-27 | Stop reason: SDUPTHER

## 2019-09-27 RX ORDER — HYDROCODONE BITARTRATE AND ACETAMINOPHEN 10; 325 MG/1; MG/1
1 TABLET ORAL EVERY 6 HOURS PRN
Qty: 28 TABLET | Refills: 0 | Status: SHIPPED | OUTPATIENT
Start: 2019-09-27 | End: 2019-10-04

## 2019-09-27 RX ORDER — TIZANIDINE 4 MG/1
4 TABLET ORAL EVERY 8 HOURS
Qty: 90 TABLET | Refills: 11 | Status: SHIPPED | OUTPATIENT
Start: 2019-09-27 | End: 2020-11-03 | Stop reason: SDUPTHER

## 2019-09-27 NOTE — TELEPHONE ENCOUNTER
----- Message from Lurdes Vick sent at 9/27/2019  4:07 PM CDT -----  Contact: Patient  Type: Needs Medical Advice    Who Called:  patient  Symptoms (please be specific):  na  How long has patient had these symptoms:  na  Pharmacy name and phone #:   [id number] - [place], [place] - [address] [address] AT [facility] [address]  [address] [address] [place] [address]  Phone: [phone] Fax: [phone]  Best Call Back Number:   Additional Information: Patient states RxhydrOXYzine HCl (ATARAX) 25 MG tablet, directions state to take one pill 3 times a day for anxiety.  Patient explains if she takes them 3 times a day the medication will not last for 30 days.  Please call to advise. Thanks!

## 2019-09-27 NOTE — PROGRESS NOTES
"Subjective:       Patient ID: Jazmine Causey is a 48 y.o. female.    Chief Complaint: Follow-up (would like BW ordered); Medication Refill; and Nausea    Follow up    Neck pain  S/p surgery  Has follow up with neurosurgery  Requests refill on pain medicine until she sees neurosurgery    Review of Systems   Constitutional: Negative for appetite change, chills, fatigue and fever.   HENT: Negative for congestion, postnasal drip, rhinorrhea and sore throat.    Respiratory: Negative for cough, chest tightness, shortness of breath and wheezing.    Cardiovascular: Negative for chest pain and palpitations.   Gastrointestinal: Negative for abdominal distention, abdominal pain, constipation, diarrhea, nausea and vomiting.   Genitourinary: Negative for dysuria.   Musculoskeletal: Positive for arthralgias, back pain and myalgias.   Skin: Negative for color change and rash.   Neurological: Negative for dizziness, weakness and headaches.   Psychiatric/Behavioral: Negative for confusion and sleep disturbance. The patient is not nervous/anxious.          Reviewed family, medical, surgical, and social history.    Objective:      /81 (BP Location: Right arm, Patient Position: Sitting, BP Method: Medium (Automatic))   Pulse 76   Resp 16   Ht 5' 3" (1.6 m)   Wt 85.2 kg (187 lb 12.8 oz)   SpO2 98%   BMI 33.27 kg/m²   Physical Exam   Constitutional: She is oriented to person, place, and time. She appears well-developed and well-nourished.   HENT:   Head: Normocephalic.   Eyes: Pupils are equal, round, and reactive to light.   Neck: Normal range of motion. Neck supple.   Cardiovascular: Normal rate, regular rhythm, S1 normal and S2 normal.   No murmur heard.  Pulmonary/Chest: Effort normal and breath sounds normal. She has no wheezes.   Abdominal: Soft. Normal appearance and bowel sounds are normal. She exhibits no distension. There is no tenderness.   Musculoskeletal: Normal range of motion.   Neurological: She is alert " and oriented to person, place, and time.   Skin: Skin is warm and dry. Capillary refill takes less than 2 seconds.   Psychiatric: She has a normal mood and affect. Her speech is normal and behavior is normal. Thought content normal. Cognition and memory are normal.   Vitals reviewed.      Assessment:       1. Hyperlipidemia, unspecified hyperlipidemia type    2. Hypothyroidism, unspecified type    3. Thoracic compression fracture, closed, initial encounter        Plan:       Hyperlipidemia, unspecified hyperlipidemia type  -     Comprehensive metabolic panel; Future; Expected date: 09/27/2019  -     CBC auto differential; Future; Expected date: 09/27/2019  -     Lipid panel; Future; Expected date: 09/27/2019  -     TSH; Future; Expected date: 09/27/2019  -     T4, free; Future; Expected date: 09/27/2019    Hypothyroidism, unspecified type  -     Comprehensive metabolic panel; Future; Expected date: 09/27/2019  -     CBC auto differential; Future; Expected date: 09/27/2019  -     Lipid panel; Future; Expected date: 09/27/2019  -     TSH; Future; Expected date: 09/27/2019  -     T4, free; Future; Expected date: 09/27/2019    Thoracic compression fracture, closed, initial encounter  -     HYDROcodone-acetaminophen (NORCO)  mg per tablet; Take 1 tablet by mouth every 6 (six) hours as needed for Pain.  Dispense: 28 tablet; Refill: 0  -     promethazine (PHENERGAN) 25 MG tablet; Take 1 tablet (25 mg total) by mouth every 4 (four) hours as needed for Nausea.  Dispense: 30 tablet; Refill: 0    Other orders  -     Discontinue: HYDROcodone-acetaminophen (NORCO) 5-325 mg per tablet; TK 1 TO 2 TS PO Q 6 H FOR 5 DAYS PRF MODERATE PAIN  Dispense: 30 tablet; Refill: 0  -     hydrOXYzine HCl (ATARAX) 25 MG tablet; Take 1 tablet (25 mg total) by mouth 3 (three) times daily as needed (anxiety).  Dispense: 30 tablet; Refill: 0            Risks, benefits, and side effects were discussed with the patient. All questions were  answered to the fullest satisfaction of the patient, and pt verbalized understanding and agreement to treatment plan. Pt was to call with any new or worsening symptoms, or present to the ER.

## 2019-09-30 ENCOUNTER — TELEPHONE (OUTPATIENT)
Dept: FAMILY MEDICINE | Facility: CLINIC | Age: 49
End: 2019-09-30

## 2019-09-30 RX ORDER — ROSUVASTATIN CALCIUM 10 MG/1
10 TABLET, COATED ORAL DAILY
Qty: 90 TABLET | Refills: 3 | Status: SHIPPED | OUTPATIENT
Start: 2019-09-30 | End: 2019-11-12 | Stop reason: SDUPTHER

## 2019-09-30 NOTE — TELEPHONE ENCOUNTER
Pt informed of results, and is agreeable to start cholesterol medication.         ----- Message from Francisco Javier Cloud MD sent at 9/29/2019  9:19 AM CDT -----  Please let this lady know that her blood work was fine, save that her cholesterol was high, and I would like to place her cholesterol meds, with her permission.

## 2019-11-07 ENCOUNTER — PATIENT OUTREACH (OUTPATIENT)
Dept: ADMINISTRATIVE | Facility: HOSPITAL | Age: 49
End: 2019-11-07

## 2019-11-12 RX ORDER — ROSUVASTATIN CALCIUM 10 MG/1
10 TABLET, COATED ORAL DAILY
Qty: 90 TABLET | Refills: 3 | Status: SHIPPED | OUTPATIENT
Start: 2019-11-12 | End: 2020-03-04 | Stop reason: SDUPTHER

## 2019-11-12 NOTE — TELEPHONE ENCOUNTER
+Refill request(s). Please advise. Thank you.     ----- Message from Myla Walker sent at 11/12/2019  8:56 AM CST -----  Contact: Jazmine yan  Type: Needs Medical Advice    Who Called:  Jazmine Soria Call Back Number: [phone]    Additional Information: Pls call pt regarding her cholesterol meds.

## 2019-11-13 ENCOUNTER — PATIENT OUTREACH (OUTPATIENT)
Dept: ADMINISTRATIVE | Facility: HOSPITAL | Age: 49
End: 2019-11-13

## 2019-11-27 ENCOUNTER — OFFICE VISIT (OUTPATIENT)
Dept: FAMILY MEDICINE | Facility: CLINIC | Age: 49
End: 2019-11-27
Payer: MEDICAID

## 2019-11-27 VITALS
DIASTOLIC BLOOD PRESSURE: 62 MMHG | BODY MASS INDEX: 32.64 KG/M2 | WEIGHT: 184.19 LBS | HEIGHT: 63 IN | SYSTOLIC BLOOD PRESSURE: 101 MMHG | RESPIRATION RATE: 16 BRPM | OXYGEN SATURATION: 97 % | HEART RATE: 58 BPM

## 2019-11-27 DIAGNOSIS — S16.1XXA STRAIN OF NECK MUSCLE, INITIAL ENCOUNTER: Primary | ICD-10-CM

## 2019-11-27 PROCEDURE — 99214 PR OFFICE/OUTPT VISIT, EST, LEVL IV, 30-39 MIN: ICD-10-PCS | Mod: S$PBB,,, | Performed by: FAMILY MEDICINE

## 2019-11-27 PROCEDURE — 99999 PR PBB SHADOW E&M-EST. PATIENT-LVL III: ICD-10-PCS | Mod: PBBFAC,,, | Performed by: FAMILY MEDICINE

## 2019-11-27 PROCEDURE — 99999 PR PBB SHADOW E&M-EST. PATIENT-LVL III: CPT | Mod: PBBFAC,,, | Performed by: FAMILY MEDICINE

## 2019-11-27 PROCEDURE — 99214 OFFICE O/P EST MOD 30 MIN: CPT | Mod: S$PBB,,, | Performed by: FAMILY MEDICINE

## 2019-11-27 PROCEDURE — 99213 OFFICE O/P EST LOW 20 MIN: CPT | Mod: PBBFAC,PN | Performed by: FAMILY MEDICINE

## 2019-11-27 RX ORDER — MELOXICAM 15 MG/1
15 TABLET ORAL DAILY
Qty: 30 TABLET | Refills: 2 | Status: SHIPPED | OUTPATIENT
Start: 2019-11-27 | End: 2020-03-03 | Stop reason: SDUPTHER

## 2019-11-27 RX ORDER — PREDNISONE 20 MG/1
20 TABLET ORAL 2 TIMES DAILY
Qty: 10 TABLET | Refills: 0 | Status: SHIPPED | OUTPATIENT
Start: 2019-11-27 | End: 2019-12-02

## 2019-11-27 NOTE — PROGRESS NOTES
"Subjective:       Patient ID: Jazmine Causey is a 48 y.o. female.    Chief Complaint: Follow-up (Pt refused vaccinations); Headache; and Blurred Vision    Neck pain  Physical therapy scheduled  Last few months  Improving with steroids  Worse with movement  No radiation  Associated with headaches.    Review of Systems   Constitutional: Negative for activity change, appetite change, chills, fatigue and fever.   HENT: Negative for congestion, dental problem, facial swelling, nosebleeds, postnasal drip, sinus pain, sore throat, trouble swallowing and voice change.    Eyes: Negative for pain, discharge and visual disturbance.   Respiratory: Negative for apnea, cough, chest tightness and shortness of breath.    Cardiovascular: Negative for chest pain and palpitations.   Gastrointestinal: Negative for abdominal pain, blood in stool, constipation and nausea.   Endocrine: Negative for cold intolerance, polydipsia and polyuria.   Genitourinary: Negative for difficulty urinating, enuresis and flank pain.   Musculoskeletal: Positive for arthralgias, back pain, gait problem and myalgias.   Skin: Negative for color change.   Allergic/Immunologic: Negative for environmental allergies and immunocompromised state.   Neurological: Negative for dizziness and light-headedness.   Hematological: Negative for adenopathy.   Psychiatric/Behavioral: Negative for agitation, behavioral problems, decreased concentration and dysphoric mood. The patient is not nervous/anxious.    All other systems reviewed and are negative.        Reviewed family, medical, surgical, and social history.    Objective:      /62 (BP Location: Left arm, Patient Position: Sitting, BP Method: Medium (Automatic))   Pulse (!) 58   Resp 16   Ht 5' 3" (1.6 m)   Wt 83.6 kg (184 lb 3.2 oz)   SpO2 97%   BMI 32.63 kg/m²   Physical Exam   Constitutional: She is oriented to person, place, and time. She appears well-developed and well-nourished. No distress.   HENT: "   Head: Normocephalic and atraumatic.   Nose: Nose normal.   Mouth/Throat: Oropharynx is clear and moist. No oropharyngeal exudate.   Eyes: Pupils are equal, round, and reactive to light. Conjunctivae and EOM are normal. No scleral icterus.   Neck: Normal range of motion. Neck supple. No thyromegaly present.   Cardiovascular: Normal rate, regular rhythm and normal heart sounds. Exam reveals no gallop and no friction rub.   No murmur heard.  Pulmonary/Chest: Effort normal and breath sounds normal. No respiratory distress. She has no wheezes. She has no rales. She exhibits no tenderness.   Abdominal: Soft. Bowel sounds are normal. She exhibits no distension. There is no tenderness. There is no guarding.   Musculoskeletal: Normal range of motion. She exhibits tenderness and deformity. She exhibits no edema.   Lymphadenopathy:     She has no cervical adenopathy.   Neurological: She is alert and oriented to person, place, and time. She displays normal reflexes. No cranial nerve deficit or sensory deficit. She exhibits normal muscle tone.   Skin: Skin is warm and dry. No rash noted. She is not diaphoretic. No erythema. No pallor.   Psychiatric: She has a normal mood and affect. Her behavior is normal. Judgment and thought content normal.   Nursing note and vitals reviewed.      Assessment:       1. Strain of neck muscle, initial encounter        Plan:       Strain of neck muscle, initial encounter  -     predniSONE (DELTASONE) 20 MG tablet; Take 1 tablet (20 mg total) by mouth 2 (two) times daily. for 5 days  Dispense: 10 tablet; Refill: 0  -     meloxicam (MOBIC) 15 MG tablet; Take 1 tablet (15 mg total) by mouth once daily.  Dispense: 30 tablet; Refill: 2            Risks, benefits, and side effects were discussed with the patient. All questions were answered to the fullest satisfaction of the patient, and pt verbalized understanding and agreement to treatment plan. Pt was to call with any new or worsening symptoms, or  present to the ER.

## 2019-12-05 ENCOUNTER — CLINICAL SUPPORT (OUTPATIENT)
Dept: REHABILITATION | Facility: HOSPITAL | Age: 49
End: 2019-12-05
Payer: MEDICAID

## 2019-12-05 DIAGNOSIS — Z48.811 ENCNTR FOR SURGICAL AFTCR FOL SURGERY ON THE NERVOUS SYS: Primary | ICD-10-CM

## 2019-12-05 DIAGNOSIS — M43.6 NECK STIFFNESS: ICD-10-CM

## 2019-12-05 DIAGNOSIS — R26.9 GAIT ABNORMALITY: ICD-10-CM

## 2019-12-05 DIAGNOSIS — M54.2 NECK PAIN: ICD-10-CM

## 2019-12-05 DIAGNOSIS — R26.89 BALANCE PROBLEM: ICD-10-CM

## 2019-12-05 DIAGNOSIS — M62.81 WEAKNESS OF TRUNK MUSCULATURE: ICD-10-CM

## 2019-12-05 PROCEDURE — 97163 PT EVAL HIGH COMPLEX 45 MIN: CPT | Mod: PN

## 2019-12-05 NOTE — PLAN OF CARE
OUTPATIENT THERAPY A  PHYSICAL THERAPY INITIAL EVALUATION    Name: Jazmine Causey  Clinic Number: [id number]    Evaluation Date: 12/5/2019  Visit #: 1 / 1  Authorization period Expiration: 12/04/2020   Plan of Care Expiration: 3/12/2020    Diagnosis:      Z48.811 (ICD-10-CM) - Encntr for surgical aftcr fol surgery on the nervous sys       Encounter Diagnoses   Name Primary?    Neck pain     Neck stiffness      Physician: Jay Jay Doherty MD  Treatment Orders: PT Eval and Treat  Past Medical History:   Diagnosis Date    GERD (gastroesophageal reflux disease)     Hyperlipidemia      has a current medication list which includes the following prescription(s): albuterol, alprazolam, clotrimazole-betamethasone 1-0.05%, doxycycline, ergocalciferol, hydroxyzine hcl, levothyroxine, meloxicam, polymyxin b sulf-trimethoprim, promethazine, ropinirole, rosuvastatin, sulfamethoxazole-trimethoprim 800-160mg, tizanidine, and zolpidem.  Review of patient's allergies indicates:   Allergen Reactions    Cephalexin        History   Prior Therapy:no  Social History: children live with her  Previous functional status: history of falls, over 5 in the past year  Current functional status: pain well managed, stiffness and mm spasms limiting  Work: homemaker    Subjective   History of Present Illness:patient is 48 yr old female 3 months post ofp posterior cervcal fusion on 9/12 and over 5 months post op for ACDF on 7/3. She had a fall after the initial surgery in July and had to return for surgery due to post cervical changes in hardware.  She is noted to abnormal gait pattern and poor balance.  Patient did not arrive to the clinic wearing the cervical collar    Since surgery pt becomes unsteady and dizzy.     DOI: July 3, 2019  Imaging_see MD notes: hardware changes; stable per report  x ray: Grade 1 anterolisthesis L4 on L5.   Mild compression fractures along the superior endplates of T10 and T11.  No displaced fracture.  Remaining  vertebral body heights preserved.  Disc heights relatively well preserved.  Facet degenerative change at the lowest 2 lumbar levels.  Atherosclerosis.  Pain: current 3/10, worst 7/10, best 0/10, Tight, constant  Radicular symptoms: no  Aggravating factors: no pain  Easing factors: pain managed  Precautions: no lifting >12 lbs, HARD CERVICAL COLLAR   Pts goals: get the stiffness out    Objective   Mental status: alert, oriented x3  Posture/ Alignment: Guarding, rounded shoulders    GAIT DEVIATIONS: Jazmine amb with decreased theresa, decreased velocity of limb motion, decreased step length, decreased stride length, decreased swing-to-stance ratio, decreased toe-to-floor clearance and decreased weight-shifting ability.    ROM: sitting at rest, lateral tilet to L side  Cervical AROM Pain/Dysfunction with movement   Flexion 50    Extension 50    R Side Bending 30 deg    L Side Bending 15-20    R rotation 25    L rotation 25      Strength:   R hand grjip 35 lbs (dominant)  L hand    30    R hip flex 3/5 (u/a to hold resistance), L hip 4-/5  , idalia knee ext 4/5  R knee flex in sitting 3/5, L knee flex in sitting 3-/5 (poor motor control)  Need to re assess ankle  Special Tests:    Pt/family was provided educational information, including: role of PT, goals for PT, scheduling - pt verbalized understanding. Discussed insurance plan with pt.     Functional Limitations Reporting     Category: mobility, carrying objects  Tool: Neck disability index  Score: 40% Limitation     TREATMENT   Time In: 9:07 AM  Time Out:10    Discussed Plan of Care with patient: Yes    Reiterated importance of wearing cervical collar at all times to dec risk of fall and secondary impairment.    Jazmine demonstrated fair  understanding of the education provided.     Assessment   Jazmine is a 48 y.o. female referred to outpatient physical therapy with a medical diagnosis of post op cervical fusion with damage to hardware  due to history of  frequent fall. Demonstrates impairments including limitations as described in the problem list. Pt prognosis is Good. Positive prognostic factors include pain is well managed in cervical spine and and UE.  Negative prognostic factors include high risk of falling and with further injury. Pt will benefit from skilled outpatient physical therapy to address the above stated deficits, provide pt/family education, and to maximize pt's level of independence.     History  Co-morbidities and personal factors that may impact the plan of care Examination  Body Structures and Functions, activity limitations and participation restrictions that may impact the plan of care    Clinical Presentation   Co-morbidities:   financial considerations and level of undertstanding of current condition  Abnormal ankle synergy  S/p CVA  Hypothyroid  aquired eversion of r foot  Closed compression fx of lumbar spine  osteoporosis      Personal Factors:   no deficits Body Regions:   head  neck  back  lower extremities  upper extremities  trunk    Body Systems:    ROM  strength  gross coordinated movement  balance  gait  transfers  transitions  motor control            Participation Restrictions:   No lifting over 10 lbs     Activity limitations:   Learning and applying knowledge  no deficits    General Tasks and Commands  no deficits    Communication  no deficits    Mobility  lifting and carrying objects  walking    Self care  washing oneself (bathing, drying, washing hands)  caring for body parts (brushing teeth, shaving, grooming)  toileting  dressing  looking after one's health    Domestic Life  shopping  doing house work (cleaning house, washing dishes, laundry)    Interactions/Relationships  no deficits    Life Areas  basic economic transactions    Community and Social Life  no deficits         unstable clinical presentation with unpredictable characteristics                    high   high  high Decision Making/ Complexity Score:  high          Anticipated Barriers for therapy: compression fx, balance, gait deficit, hardware changes, multiple fused levels, osteoporosis  Pt's spiritual, cultural and educational needs considered and pt agreeable to plan of care and goals as stated below:     Short Term GOALS: 3 weeks  In 3 weeks:  1.  Pt will improve flexibility while dec muscle spasms to improve quality of life  2. Pt will improve strength to improve tolerance and compliance with careplan.   3. Pt will be educated in pain management,  posture, safety techniques, precautions and HEP.    Long Term GOALS:6-8 weeks  1. Pt will have 0-1/10 pain allowing pt to tolerate working a full day.    2. Pt will improve neck Index score to 20% or less.   3.    Pt will improve Ham balance Score by 5 points or more   3.   Pt will be independent with HEP and SX management     Plan   Certification Period: 12/5/2019 to 3/5/2020.      Recommend patient be treated for gait and balance deficit and trunk weakness in addition to the impairments associated with cervical spine. patient my benefit from orthotic for R ankle    Outpatient physical therapy 3 times weekly to include: Aquatic Therapy, Cervical/Lumbar Traction, Electrical Stimulation ifc, Gait Training, Manual Therapy, Moist Heat/ Ice, Neuromuscular Re-ed, Orthotic Management and Training, Paraffin and Patient Education, dry needling prn, modalities prn Cont PT for 3 months.   Pt may be seen by PTA as part of the rehabilitation team.     I certify the need for these services furnished under this plan of treatment and while under my care.    Marcella Gómez, PT

## 2019-12-12 PROBLEM — M62.81 WEAKNESS OF TRUNK MUSCULATURE: Status: ACTIVE | Noted: 2019-12-12

## 2019-12-12 PROBLEM — R26.89 BALANCE PROBLEM: Status: ACTIVE | Noted: 2019-12-12

## 2019-12-12 PROBLEM — R26.9 GAIT ABNORMALITY: Status: ACTIVE | Noted: 2019-12-12

## 2019-12-13 NOTE — PROGRESS NOTES
PT eval complete. Please see POC for details. Supervised face to face visit with Alexis Ca PTA to discuss, current level of function, POC and progress toward goals.

## 2019-12-31 DIAGNOSIS — E03.9 HYPOTHYROIDISM, UNSPECIFIED TYPE: ICD-10-CM

## 2019-12-31 RX ORDER — LEVOTHYROXINE SODIUM 112 UG/1
112 TABLET ORAL DAILY
Qty: 30 TABLET | Refills: 11 | Status: SHIPPED | OUTPATIENT
Start: 2019-12-31 | End: 2020-03-04 | Stop reason: SDUPTHER

## 2019-12-31 NOTE — TELEPHONE ENCOUNTER
----- Message from Leonie Ritter sent at 12/31/2019  9:56 AM CST -----  Contact: patient  Type:  RX Refill Request    Who Called:  patient  Refill or New Rx:  refill  RX Name and Strength:  levothyroxine (SYNTHROID) 112 MCG tablet  How is the patient currently taking it? (ex. 1XDay):  Sig - Route: Take 1 tablet (112 mcg total) by mouth once daily. - Oral  Is this a 30 day or 90 day RX:  30 day supply  Preferred Pharmacy with phone number:    [id number] - [place] - [address] [address] [address] [address] [facility]  [address] [address] [address]  Phone: [phone] Fax: [phone]  Local or Mail Order:  local  Ordering Provider:  Pedro Luis Soria Call Back Number:  [phone]  Additional Information:  Patient is completely out of the medication and is requesting medication to be sent to the pharmacy today

## 2020-01-06 ENCOUNTER — CLINICAL SUPPORT (OUTPATIENT)
Dept: REHABILITATION | Facility: HOSPITAL | Age: 50
End: 2020-01-06
Payer: MEDICAID

## 2020-01-06 DIAGNOSIS — M62.81 WEAKNESS OF TRUNK MUSCULATURE: ICD-10-CM

## 2020-01-06 DIAGNOSIS — M43.6 NECK STIFFNESS: ICD-10-CM

## 2020-01-06 DIAGNOSIS — M54.2 NECK PAIN: ICD-10-CM

## 2020-01-06 DIAGNOSIS — R26.9 GAIT ABNORMALITY: ICD-10-CM

## 2020-01-06 DIAGNOSIS — R26.89 BALANCE PROBLEM: ICD-10-CM

## 2020-01-06 PROCEDURE — 97010 HOT OR COLD PACKS THERAPY: CPT | Mod: PN

## 2020-01-06 PROCEDURE — 97110 THERAPEUTIC EXERCISES: CPT | Mod: PN

## 2020-01-06 PROCEDURE — 97140 MANUAL THERAPY 1/> REGIONS: CPT | Mod: PN

## 2020-01-06 NOTE — PROGRESS NOTES
Physical Therapy Daily Treatment Note     Name: Jazmine Lovellub  Clinic Number: [id number]    Therapy Diagnosis:   Encounter Diagnoses   Name Primary?    Gait abnormality     Balance problem     Weakness of trunk musculature     Neck pain     Neck stiffness      Physician: Jay Jay Doherty MD    Visit Date: 1/6/2020    Physician Orders:   Medical Diagnosis:      Z48.811 (ICD-10-CM) - Encntr for surgical aftcr fol surgery on the nervous sys       Evaluation Date: 12/5/19  Authorization period Expiration: 12/04/2020   Plan of Care Expiration: 3/12/2020    Visit #/Visits authorized: 1/ 12      Time In: 100  Time Out: 200  Total Billable Time: 50 minutes    Precautions:hardware in spine, drop foot    Subjective     Pt reports stiffness   She was/was not compliant with home exercise program. NA  Response to previous treatment: NA  Functional change: NA    Pain: 0/10  Location: bilateral head , neck  and shoulder      Objective     Jazmine received therapeutic exercises to develop strength, endurance, ROM, flexibility, posture and core stabilization for 40 minutes including:  Slouch correct 12 x 10 sec hold  c spine retract 2 x 10  c spine arom in all planes x 10 sslowwely  scap retract x 10  Shoulder shrughsx 10   nustep x 10 min    Jazmine received the following manual therapy techniques: Manual traction and Myofacial release were applied to the: UT. Levator and scalen for 10 minutes, including: TrP release   MHP x 15 min neck and shoulders    Jazmine received the following unsupervised modalities after being cleared for contradictions:    Home Exercises Provided and Patient Education Provided     Education provided:   - safety with transfers and gait, proper lifting technique, positioning for posture, pain managemtent    Written Home Exercises Provided: NA.  Exercises were reviewed and Jazmyne able to demonstrate them prior to the end of the session.  Jazmine demonstrated fair   understanding of the education provided.     See EMR under Media for exercises provided upcoming visit.      Assessment     wilfredo well w no adverse affecgs    Jazmine is progressing well towards her goals.   Pt prognosis is Excellent.     Pt will continue to benefit from skilled outpatient physical therapy to address the deficits listed in the problem list box on initial evaluation, provide pt/family education and to maximize pt's level of independence in the home and community environment.     Pt's spiritual, cultural and educational needs considered and pt agreeable to plan of care and goals.    Anticipated barriers to physical therapy: hardware in spine    Goals: see eval    Plan     Progress as wilfredo; aquatic therapy, dry needling if deemed appropriate and HEP    Marcella Gómez, PT

## 2020-01-09 ENCOUNTER — CLINICAL SUPPORT (OUTPATIENT)
Dept: REHABILITATION | Facility: HOSPITAL | Age: 50
End: 2020-01-09
Payer: MEDICAID

## 2020-01-09 DIAGNOSIS — M54.2 NECK PAIN: ICD-10-CM

## 2020-01-09 DIAGNOSIS — M43.6 NECK STIFFNESS: ICD-10-CM

## 2020-01-09 DIAGNOSIS — R26.89 BALANCE PROBLEM: ICD-10-CM

## 2020-01-09 DIAGNOSIS — R26.9 GAIT ABNORMALITY: ICD-10-CM

## 2020-01-09 DIAGNOSIS — M62.81 WEAKNESS OF TRUNK MUSCULATURE: ICD-10-CM

## 2020-01-09 PROCEDURE — 97110 THERAPEUTIC EXERCISES: CPT | Mod: PN

## 2020-01-09 PROCEDURE — 97140 MANUAL THERAPY 1/> REGIONS: CPT | Mod: PN

## 2020-01-09 NOTE — PROGRESS NOTES
Physical Therapy Daily Treatment Note     Name: Jazmine GUTIERREZ [facility]  Clinic Number: [id number]    Therapy Diagnosis:   Encounter Diagnoses   Name Primary?    Gait abnormality     Balance problem     Weakness of trunk musculature     Neck pain     Neck stiffness      Physician: Jay Jay Doherty MD    Visit Date: 1/9/2020    Physician Orders:   Medical Diagnosis:      Z48.811 (ICD-10-CM) - Encntr for surgical aftcr fol surgery on the nervous sys       Evaluation Date: 12/5/19  Authorization period Expiration: 12/04/2020   Plan of Care Expiration: 3/12/2020    Visit #/Visits authorized: 3/ 12      Time In: 1245  Time Out: 100  Total Billable Time: 65minutes    Precautions:hardware in spine, drop foot, pld compression fx to T10/T11    Subjective     Pt reports stiffness   She wascompliant with home exercise program.   Response to previous treatment:   Functional change: no    Pain: 0/10  Location: bilateral head , neck  and shoulder      Objective     Jazmine received therapeutic exercises to develop strength, endurance, ROM, flexibility, posture and core stabilization for  45 minutes including:  Slouch correct 12 x 10 sec hold  c spine retract 2 x 10  c spine arom in all planes x 10 sslowwely  scap retract x 10  Shoulder shrughsx 10   nustep x 20 min  Prom b scap in side-lying in all planes to include end range stretcyh    Jazmine received the following manual therapy techniques: occipital release and  Myofacial release were applied to the: UT. Levator and scalene for 15 minutes;   MHP x 15 min neck and shoulders    Jazmine received the following unsupervised modalities after being cleared for contradictions:    Home Exercises Provided and Patient Education Provided     Education provided:   - safety with transfers and gait, proper lifting technique, positioning for posture, pain managemtent    Written Home Exercises Provided: NA.  Exercises were reviewed and Jazmyne able to  demonstrate them prior to the end of the session.  Jazmine demonstrated fair  understanding of the education provided.     See EMR under Media for exercises provided upcoming visit.      Assessment     wilfredo well w no adverse affecgs    Jazmine is progressing well towards her goals.   Pt prognosis is Excellent.     Pt will continue to benefit from skilled outpatient physical therapy to address the deficits listed in the problem list box on initial evaluation, provide pt/family education and to maximize pt's level of independence in the home and community environment.     Pt's spiritual, cultural and educational needs considered and pt agreeable to plan of care and goals.    Anticipated barriers to physical therapy: hardware in spine    Goals: see eval    Plan     Progress as wilfredo; aquatic therapy, dry needling if deemed appropriate and HEP    Marcella Gómez, PT

## 2020-01-10 ENCOUNTER — CLINICAL SUPPORT (OUTPATIENT)
Dept: REHABILITATION | Facility: HOSPITAL | Age: 50
End: 2020-01-10
Payer: MEDICAID

## 2020-01-10 DIAGNOSIS — M43.6 NECK STIFFNESS: ICD-10-CM

## 2020-01-10 DIAGNOSIS — M62.81 WEAKNESS OF TRUNK MUSCULATURE: ICD-10-CM

## 2020-01-10 DIAGNOSIS — M54.2 NECK PAIN: ICD-10-CM

## 2020-01-10 DIAGNOSIS — R26.89 BALANCE PROBLEM: ICD-10-CM

## 2020-01-10 DIAGNOSIS — R26.9 GAIT ABNORMALITY: ICD-10-CM

## 2020-01-10 PROCEDURE — 97014 ELECTRIC STIMULATION THERAPY: CPT | Mod: PN

## 2020-01-10 PROCEDURE — 97010 HOT OR COLD PACKS THERAPY: CPT | Mod: PN

## 2020-01-10 PROCEDURE — 97110 THERAPEUTIC EXERCISES: CPT | Mod: PN

## 2020-01-13 ENCOUNTER — CLINICAL SUPPORT (OUTPATIENT)
Dept: REHABILITATION | Facility: HOSPITAL | Age: 50
End: 2020-01-13
Payer: MEDICAID

## 2020-01-13 DIAGNOSIS — M62.81 WEAKNESS OF TRUNK MUSCULATURE: ICD-10-CM

## 2020-01-13 DIAGNOSIS — R26.9 GAIT ABNORMALITY: ICD-10-CM

## 2020-01-13 DIAGNOSIS — M43.6 NECK STIFFNESS: ICD-10-CM

## 2020-01-13 DIAGNOSIS — M54.2 NECK PAIN: ICD-10-CM

## 2020-01-13 DIAGNOSIS — R26.89 BALANCE PROBLEM: ICD-10-CM

## 2020-01-13 PROCEDURE — 97116 GAIT TRAINING THERAPY: CPT | Mod: PN

## 2020-01-13 PROCEDURE — 97112 NEUROMUSCULAR REEDUCATION: CPT | Mod: PN

## 2020-01-13 PROCEDURE — 97110 THERAPEUTIC EXERCISES: CPT | Mod: PN

## 2020-01-15 ENCOUNTER — CLINICAL SUPPORT (OUTPATIENT)
Dept: REHABILITATION | Facility: HOSPITAL | Age: 50
End: 2020-01-15
Payer: MEDICAID

## 2020-01-15 DIAGNOSIS — R26.9 GAIT ABNORMALITY: ICD-10-CM

## 2020-01-15 DIAGNOSIS — M54.2 NECK PAIN: ICD-10-CM

## 2020-01-15 DIAGNOSIS — M43.6 NECK STIFFNESS: ICD-10-CM

## 2020-01-15 DIAGNOSIS — R26.89 BALANCE PROBLEM: Primary | ICD-10-CM

## 2020-01-15 DIAGNOSIS — M62.81 WEAKNESS OF TRUNK MUSCULATURE: ICD-10-CM

## 2020-01-15 PROCEDURE — 97110 THERAPEUTIC EXERCISES: CPT | Mod: PN,CQ

## 2020-01-15 NOTE — PROGRESS NOTES
Physical Therapy Daily Treatment Note     Name: Jazmine Lovellub  Clinic Number: [id number]    Therapy Diagnosis:   Encounter Diagnoses   Name Primary?    Balance problem Yes    Gait abnormality     Weakness of trunk musculature     Neck pain     Neck stiffness      Physician: Jay Jay Doherty MD    Visit Date: 1/15/2020    Physician Orders:   Medical Diagnosis:      Z48.811 (ICD-10-CM) - Encntr for surgical aftcr fol surgery on the nervous sys       Evaluation Date: 12/5/19  Authorization period Expiration: 12/04/2020   Plan of Care Expiration: 3/12/2020    Visit #/Visits authorized: 5 / 12      Time In: 3:00 PM   Time Out: 4:15 PM  Total Billable Time: 50minutes    Precautions:hardware in spine, drop foot, pld compression fx to T10/T11    Subjective     Pt reports pain in knee R knee joint  She was compliant with home exercise program.   Response to previous treatment:   Functional change: no    Pain: 2/10  Location: bilateral head , neck  and shoulder      Objective     Jazmine received therapeutic exercises to develop strength, endurance, ROM, flexibility, posture and core stabilization for  35 minutes including:    Toe Taps x 3 mins  Side Steps x 3 mins  Step-Ups x 15  Bridges x 15  Ball Squeezes x 3 mins  SLR x 10   Quad. Alt Arm x 10  Quad. Alt Arm/Leg x 10  3 Way Scapular Retraction x 15 with Black TB  Slouch correct 10 x 10 sec hold  c spine retract 2 x 10  c spine arom in all planes x 10 slowly  Shoulder Shrugsx 10   Corner stretch  Nustep level 5 x 15 min       DNP  Jazmine received the following manual therapy techniques: occipital release and  Myofacial release were applied to the: UT. Levator and scalene for 15 minutes;   MHP x 15 min neck and shoulders    DNP  Unsupervised modalities IFC to R shoulder/periscapula for pain modulation  x 15 min with ice pack.  Call bell placed within reach and 5 min check to ensure comfort.     Home Exercises Provided and Patient  Education Provided     Education provided:   - safety with transfers and gait, proper lifting technique, positioning for posture, pain managemtent    Written Home Exercises Provided: NA.  Exercises were reviewed and Jazmyne able to demonstrate them prior to the end of the session.  Jazmine demonstrated fair  understanding of the education provided.     See EMR under Media for exercises provided upcoming visit.      Assessment     Patient did well with exercises; focused on strengthening, ROM and balance today; will continue to progress as patient tolerates.      Jazmine is progressing well towards her goals.   Pt prognosis is Excellent.     Pt will continue to benefit from skilled outpatient physical therapy to address the deficits listed in the problem list box on initial evaluation, provide pt/family education and to maximize pt's level of independence in the home and community environment.     Pt's spiritual, cultural and educational needs considered and pt agreeable to plan of care and goals.    Anticipated barriers to physical therapy: hardware in spine    Goals: see eval    Plan     Progress as wilfredo; aquatic therapy, dry needling if deemed appropriate and HEP    Marcella Gómez, PT

## 2020-01-15 NOTE — PROGRESS NOTES
Physical Therapy Daily Treatment Note     Name: Jazmine Causey  Clinic Number: [id number]    Therapy Diagnosis:   Encounter Diagnoses   Name Primary?    Gait abnormality     Balance problem     Weakness of trunk musculature     Neck pain     Neck stiffness      Physician: Jay Jay Doherty MD    Visit Date: 1/10/2020    Physician Orders:   Medical Diagnosis:      Z48.811 (ICD-10-CM) - Encntr for surgical aftcr fol surgery on the nervous sys       Evaluation Date: 12/5/19  Authorization period Expiration: 12/04/2020   Plan of Care Expiration: 3/12/2020    Visit #/Visits authorized: 4/ 12      Time In: 100  Time Out: 150  Total Billable Time: 50minutes    Precautions:hardware in spine, drop foot, pld compression fx to T10/T11    Subjective     Pt reports pain in knee R knee joint  She was compliant with home exercise program.   Response to previous treatment:   Functional change: no    Pain: 3/10  Location: bilateral head , neck  and shoulder      Objective     Jazmine received therapeutic exercises to develop strength, endurance, ROM, flexibility, posture and core stabilization for  35 minutes including:  Slouch correct 12 x 10 sec hold  c spine retract 2 x 10  c spine arom in all planes x 10 sslowwely  scap retract x 10  Shoulder shrughsx 10   Corner stretch  nustep x 20 min      NOT PERFORMED THIS SESSION  Jazmine received the following manual therapy techniques: occipital release and  Myofacial release were applied to the: UT. Levator and scalene for 15 minutes;   MHP x 15 min neck and shoulders     Unsupervised modalities IFC to R shoulder/periscapula for pain modulation  x 15 min with ice pack.  Call bell placed within reach and 5 min check to ensure comfort.     Home Exercises Provided and Patient Education Provided     Education provided:   - safety with transfers and gait, proper lifting technique, positioning for posture, pain managemtent    Written Home Exercises Provided:  NA.  Exercises were reviewed and Jazmyne able to demonstrate them prior to the end of the session.  Jazmine demonstrated fair  understanding of the education provided.     See EMR under Media for exercises provided upcoming visit.      Assessment   Presents with signs of SCI incomplete     Jazmine is progressing well towards her goals.   Pt prognosis is Excellent.     Pt will continue to benefit from skilled outpatient physical therapy to address the deficits listed in the problem list box on initial evaluation, provide pt/family education and to maximize pt's level of independence in the home and community environment.     Pt's spiritual, cultural and educational needs considered and pt agreeable to plan of care and goals.    Anticipated barriers to physical therapy: hardware in spine    Goals: see eval    Plan     Progress as wilfredo; aquatic therapy, dry needling if deemed appropriate and HEP    Marcella Gómez, PT

## 2020-01-16 NOTE — PROGRESS NOTES
Physical Therapy Daily Treatment Note     Name: Jazmine Lovellub  Clinic Number: [id number]    Therapy Diagnosis:   Encounter Diagnoses   Name Primary?    Gait abnormality     Balance problem     Weakness of trunk musculature     Neck pain     Neck stiffness      Physician: Jay Jay Doherty MD    Visit Date: 1/13/2020    Physician Orders:   Medical Diagnosis:      Z48.811 (ICD-10-CM) - Encntr for surgical aftcr fol surgery on the nervous sys       Evaluation Date: 12/5/19  Authorization period Expiration: 12/04/2020   Plan of Care Expiration: 3/12/2020    Visit #/Visits authorized: 5/ 12      Time In: 200  Time Out: 300  Total Billable Time: 60minutes    Precautions:hardware in spine, drop foot, pld compression fx to T10/T11    Subjective     Pt reports: non new complaints  She was compliant with home exercise program.   Response to previous treatment: positive  Functional change: no    Pain: 3/10  Location: bilateral head , neck  and shoulder      Objective   Neuro re-ed to improve balance, coordination, dec tone, and reduce abnormal synergy:  R ankle wrapped in ace into DF/EV:  On blue balnce foam, r and l step ups w min//mod manual cueing avoiding compensation x 30 each; side step ups x 30 each  Gait tx with ankle wrapped in DF, focucing on R terminal knee ext, heel contact and 3 rocker phases  nustep x 20 min  LE prom in all planes x 5    NOT PERFORMED THIS SESSION  Jazmine received therapeutic exercises to develop strength, endurance, ROM, flexibility, posture and core stabilization for  35 minutes including:  Slouch correct 12 x 10 sec hold  c spine retract 2 x 10  c spine arom in all planes x 10 sslowwely  scap retract x 10  Shoulder shrughsx 10   Corner stretch  nustep x 20 min      NOT PERFORMED THIS SESSION  Jazmine received the following manual therapy techniques: occipital release and  Myofacial release were applied to the: UT. Levator and scalene for 15 minutes;   MHP x  15 min neck and shoulders   Unsupervised modalities IFC to R shoulder/periscapula for pain modulation  x 15 min with ice pack.  Call bell placed within reach and 5 min check to ensure comfort.     Home Exercises Provided and Patient Education Provided     Education provided:   - safety with transfers and gait, proper lifting technique, positioning for posture, pain managemtent    Written Home Exercises Provided: NA.  Exercises were reviewed and Jazmyne able to demonstrate them prior to the end of the session.  Jazmine demonstrated fair  understanding of the education provided.     See EMR under Media for exercises provided upcoming visit.      Assessment   Pt would benefit from AFO to dec risk of falls. Will message Dr Chas Lucero is progressing well towards her goals.   Pt prognosis is Excellent.     Pt will continue to benefit from skilled outpatient physical therapy to address the deficits listed in the problem list box on initial evaluation, provide pt/family education and to maximize pt's level of independence in the home and community environment.     Pt's spiritual, cultural and educational needs considered and pt agreeable to plan of care and goals.    Anticipated barriers to physical therapy: hardware in spine    Goals: see eval    Plan     Progress as wilfredo; aquatic therapy, dry needling if deemed appropriate and HEP; request order for AFO.  Supervised face to face visit with Alexis Ca PTA to discuss, current level of function, POC and progress toward goals.     Marcella Gómez, PT

## 2020-01-27 ENCOUNTER — CLINICAL SUPPORT (OUTPATIENT)
Dept: REHABILITATION | Facility: HOSPITAL | Age: 50
End: 2020-01-27
Payer: MEDICAID

## 2020-01-27 DIAGNOSIS — M43.6 NECK STIFFNESS: ICD-10-CM

## 2020-01-27 DIAGNOSIS — R26.9 GAIT ABNORMALITY: ICD-10-CM

## 2020-01-27 DIAGNOSIS — R26.89 BALANCE PROBLEM: ICD-10-CM

## 2020-01-27 DIAGNOSIS — M54.2 NECK PAIN: ICD-10-CM

## 2020-01-27 DIAGNOSIS — M62.81 WEAKNESS OF TRUNK MUSCULATURE: ICD-10-CM

## 2020-01-27 PROCEDURE — 97110 THERAPEUTIC EXERCISES: CPT | Mod: PN

## 2020-01-27 PROCEDURE — 97112 NEUROMUSCULAR REEDUCATION: CPT | Mod: PN

## 2020-01-27 PROCEDURE — 97116 GAIT TRAINING THERAPY: CPT | Mod: PN

## 2020-01-27 NOTE — PROGRESS NOTES
Physical Therapy Daily Treatment Note     Name: Jazmine Causey  Clinic Number: [id number]    Therapy Diagnosis:   Encounter Diagnoses   Name Primary?    Gait abnormality     Balance problem     Weakness of trunk musculature     Neck pain     Neck stiffness      Physician: Jay Jay Doherty MD    Visit Date: 1/27/2020    Physician Orders:   Medical Diagnosis:      Z48.811 (ICD-10-CM) - Encntr for surgical aftcr fol surgery on the nervous sys       Evaluation Date: 12/5/19  Authorization period Expiration: 12/04/2020   Plan of Care Expiration: 3/12/2020    Visit #/Visits authorized: 7/ 12      Time In: 1258 PM   Time Out  PM  Total Billable Time: 50 minutes  1 re. 2 nr, 1gyPrecautions:hardware in spine, drop foot, pld compression fx to T10/T11    Subjective     Pt reports no falls  She was compliant with home exercise program.   Response to previous treatment:   Functional change:     Pain: 2/10  Location: bilateral head , neck  and shoulder      Objective     Jazmine received therapeutic exercises to develop strength, endurance, ROM, flexibility, posture and core stabilization for 10 min minutes including:  Stretch HS, ant tib, heel cords and HS x 10   Cycle x 12 min      Neuro sheree to improve balance, prevent falls, improve coordination, and prevent abnormal synergy in R ankle x 30 imn  Toe Taps x 3 mins  Side Steps laterally  x 3 mins  Step-Ups x 15    Gait tx in // bars and in clinic on level surface no AD; w ace wrapped simulated afo min manual cueing to reach terminal knee ext, and heel contact; arm swing corrected temporarily w vc;s; min vc's to equalized time in stance as L > than right    DNP  Bridges x 15  Ball Squeezes x 3 mins  SLR x 10   Quad. Alt Arm x 10  Quad. Alt Arm/Leg x 10  3 Way Scapular Retraction x 15 with Black TB  Slouch correct 10 x 10 sec hold  c spine retract 2 x 10  c spine arom in all planes x 10 slowly  Shoulder Shrugsx 10   Corner stretch  DNP        DNP  Jazmine received the following manual therapy techniques: occipital release and  Myofacial release were applied to the: UT. Levator and scalene for 15 minutes;   MHP x 15 min neck and shoulders    DNP  Unsupervised modalities IFC to R shoulder/periscapula for pain modulation  x 15 min with ice pack.  Call bell placed within reach and 5 min check to ensure comfort.     Home Exercises Provided and Patient Education Provided     Education provided:   - safety with transfers and gait, proper lifting technique, positioning for posture, pain managemtent    Written Home Exercises Provided: NA.  Exercises were reviewed and Isaiass able to demonstrate them prior to the end of the session.  Jazmine demonstrated fair  understanding of the education provided.     See EMR under Media for exercises provided upcoming visit.      Assessment   Pt is high risk for fall and vital to preserve upper cervical vertebra. Inc rom of t spin and scapula    Jazmine is progressing well towards her goals.   Pt prognosis is Excellent.     Pt will continue to benefit from skilled outpatient physical therapy to address the deficits listed in the problem list box on initial evaluation, provide pt/family education and to maximize pt's level of independence in the home and community environment.     Pt's spiritual, cultural and educational needs considered and pt agreeable to plan of care and goals.    Anticipated barriers to physical therapy: hardware in spine    Goals: see eval    Plan   Called medicaid and pt is responsible for purchasing of AFO.    Marcella Gómez, PT

## 2020-01-29 ENCOUNTER — CLINICAL SUPPORT (OUTPATIENT)
Dept: REHABILITATION | Facility: HOSPITAL | Age: 50
End: 2020-01-29
Payer: MEDICAID

## 2020-01-29 DIAGNOSIS — M54.2 NECK PAIN: ICD-10-CM

## 2020-01-29 DIAGNOSIS — M43.6 NECK STIFFNESS: ICD-10-CM

## 2020-01-29 DIAGNOSIS — R26.9 GAIT ABNORMALITY: ICD-10-CM

## 2020-01-29 DIAGNOSIS — M62.81 WEAKNESS OF TRUNK MUSCULATURE: ICD-10-CM

## 2020-01-29 DIAGNOSIS — R26.89 BALANCE PROBLEM: ICD-10-CM

## 2020-01-29 PROCEDURE — 97140 MANUAL THERAPY 1/> REGIONS: CPT | Mod: PN

## 2020-01-29 PROCEDURE — 97010 HOT OR COLD PACKS THERAPY: CPT | Mod: PN

## 2020-01-29 PROCEDURE — 97110 THERAPEUTIC EXERCISES: CPT | Mod: PN

## 2020-01-29 PROCEDURE — 97014 ELECTRIC STIMULATION THERAPY: CPT | Mod: PN

## 2020-01-29 NOTE — PROGRESS NOTES
Physical Therapy Daily Treatment Note     Name: Jazmine Causey  Clinic Number: [id number]    Therapy Diagnosis:   Encounter Diagnoses   Name Primary?    Gait abnormality     Balance problem     Weakness of trunk musculature     Neck pain     Neck stiffness      Physician: Jay Jay Doherty MD    Visit Date: 1/29/2020    Physician Orders:   Medical Diagnosis:      Z48.811 (ICD-10-CM) - Encntr for surgical aftcr fol surgery on the nervous sys       Evaluation Date: 12/5/19  Authorization period Expiration: 12/04/2020   Plan of Care Expiration: 3/12/2020    Visit #/Visits authorized: 8/ 12      Time In: 205PM   Time Out  300PM  Total Billable Time: 55 minutes  2 manual , 1 te,   Precautions:hardware in spine, drop foot, pld compression fx to T10/T11    Subjective     Pt reports neck tighness  She was compliant with home exercise program.   Response to previous treatment:   Functional change:     Pain: 2/10  Location: bilateral head , neck  and shoulder      Objective     Jazmine received therapeutic exercises to develop strength, endurance, ROM, flexibility, posture and core stabilization for 10 min minutes including:  Stretch HS, ant tib, heel cords and HS x 10   Cycle x 12 min    DNP  Neuro sheree to improve balance, prevent falls, improve coordination, and prevent abnormal synergy in R ankle x 30 imn  Toe Taps x 3 mins  Side Steps laterally  x 3 mins  Step-Ups x 15  Gait tx in // bars and in clinic on level surface no AD; w ace wrapped simulated afo min manual cueing to reach terminal knee ext, and heel contact; arm swing corrected temporarily w vc;s; min vc's to equalized time in stance as L > than right    DNP  Bridges x 15  Ball Squeezes x 3 mins  SLR x 10   Quad. Alt Arm x 10  Quad. Alt Arm/Leg x 10      3 Way Scapular Retraction x 15 with Black TB  Slouch correct 10 x 10 sec hold  c spine retract 2 x 10  c spine arom in all planes x 10 slowly  Shoulder Shrugsx 10   Corner stretch          Jazmine received the following manual therapy techniques: occipital release and  Myofacial release were applied to the: UT. Levator and scalene and end range arom idalia scapula in all planes for 30 minutes;   MHP x 15 min neck and shoulders    Unsupervised modalities IFC to R shoulder/periscapula for pain modulation  x 15 min with ice pack.  Call bell placed within reach and 5 min check to ensure comfort.     Home Exercises Provided and Patient Education Provided     Education provided:   - safety with transfers and gait, proper lifting technique, positioning for posture, pain managemtent    Written Home Exercises Provided: NA.  Exercises were reviewed and Isaiass able to demonstrate them prior to the end of the session.  Jazmine demonstrated fair  understanding of the education provided.     See EMR under Media for exercises provided upcoming visit.      Assessment   Work on balance    Jazmine is progressing well towards her goals.   Pt prognosis is Excellent.     Pt will continue to benefit from skilled outpatient physical therapy to address the deficits listed in the problem list box on initial evaluation, provide pt/family education and to maximize pt's level of independence in the home and community environment.     Pt's spiritual, cultural and educational needs considered and pt agreeable to plan of care and goals.    Anticipated barriers to physical therapy: hardware in spine    Goals: see eval    Plan   Called medicaid and pt is responsible for purchasing of AFO.    Marcella Gómez, PT

## 2020-02-03 ENCOUNTER — CLINICAL SUPPORT (OUTPATIENT)
Dept: REHABILITATION | Facility: HOSPITAL | Age: 50
End: 2020-02-03
Payer: MEDICAID

## 2020-02-03 DIAGNOSIS — R26.89 BALANCE PROBLEM: ICD-10-CM

## 2020-02-03 DIAGNOSIS — M62.81 WEAKNESS OF TRUNK MUSCULATURE: ICD-10-CM

## 2020-02-03 DIAGNOSIS — M54.2 NECK PAIN: ICD-10-CM

## 2020-02-03 DIAGNOSIS — M43.6 NECK STIFFNESS: ICD-10-CM

## 2020-02-03 DIAGNOSIS — R26.9 GAIT ABNORMALITY: ICD-10-CM

## 2020-02-03 PROCEDURE — 97110 THERAPEUTIC EXERCISES: CPT | Mod: PN

## 2020-02-03 PROCEDURE — 97140 MANUAL THERAPY 1/> REGIONS: CPT | Mod: PN

## 2020-02-03 NOTE — PROGRESS NOTES
Physical Therapy Daily Treatment Note     Name: Jazmine Causey  Clinic Number: [id number]    Therapy Diagnosis:   Encounter Diagnoses   Name Primary?    Gait abnormality     Balance problem     Weakness of trunk musculature     Neck pain     Neck stiffness      Physician: Jay Jay Doherty MD    Visit Date: 2/3/2020    Physician Orders:   Medical Diagnosis:      Z48.811 (ICD-10-CM) - Encntr for surgical aftcr fol surgery on the nervous sys       Evaluation Date: 12/5/19  Authorization period Expiration: 12/04/2020   Plan of Care Expiration: 3/12/2020    Visit #/Visits authorized: 9/ 12      Time In: 410PM   Time Out  510PM  Total Billable Time: 55 minutes  1 manual , 2 te,   Precautions:hardware in spine, drop foot, pld compression fx to T10/T11    Subjective     Pt reports neck tighness  She was compliant with home exercise program.   Response to previous treatment:   Functional change:     Pain: 2/10  Location: bilateral head , neck  and shoulder      Objective     Jazmine received therapeutic exercises to develop strength, endurance, ROM, flexibility, posture and core stabilization for 25 min minutes including:    Ergo bike x 6 min  Slouch correct 10 x 10 sec hold  c spine retract 2 x 10  c spine arom in all planes x 10 slowly  Shoulder Shrugsx 10   levetor stretch idalia   gentle head flex stretch w 5 breathes and releasing on exhale    DNP  Neuro sheree to improve balance, prevent falls, improve coordination, and prevent abnormal synergy in R ankle x 30 imn  Toe Taps x 3 mins  Side Steps laterally  x 3 mins  Step-Ups x 15  Gait tx in // bars and in clinic on level surface no AD; w ace wrapped simulated afo min manual cueing to reach terminal knee ext, and heel contact; arm swing corrected temporarily w vc;s; min vc's to equalized time in stance as L > than right    DNP  Bridges x 15  Ball Squeezes x 3 mins  SLR x 10   Quad. Alt Arm x 10  Quad. Alt Arm/Leg x 10  3 Way Scapular  Retraction x 15 with Black T      thera exes 25 min  Ergo bike x 6 min  Slouch correct 10 x 10 sec hold  c spine retract 2 x 10  c spine arom in all planes x 10 slowly  Shoulder Shrugsx 10   levetor stretch idalia   gentle head flex stretch w 5 breathes and releasing on exhale    Ethan receive DN to idalia UT idalia. Nodules palpated and warranted the use of DN. Pt signed release form and given a copy upon signing.   Not billed.       Jazmine received the following manual therapy techniques: Trp release and  Myofacial release were applied to the: U. Levator and scalene and sternoclomastoid x 20 min    NOT PERFORMED THIS SESSION  Unsupervised modalities IFC to R shoulder/periscapula for pain modulation  x 15 min with ice pack.  Call bell placed within reach and 5 min check to ensure comfort.     Home Exercises Provided and Patient Education Provided : pt sighned released form and given copy in return.   She demonstrated good understanding of side effects.       Education provided:   - safety with transfers and gait, proper lifting technique, positioning for posture, pain managemtent  Written Home Exercises Provided: NA.  Exercises were reviewed and Isaiass able to demonstrate them prior to the end of the session.  Jazmine demonstrated fair  understanding of the education provided.     See EMR under Media for exercises provided  And DN edu sheet and release      Assessment   Work on balance    Jazmine is progressing well towards her goals.   Pt prognosis is Excellent.     Pt will continue to benefit from skilled outpatient physical therapy to address the deficits listed in the problem list box on initial evaluation, provide pt/family education and to maximize pt's level of independence in the home and community environment.     Pt's spiritual, cultural and educational needs considered and pt agreeable to plan of care and goals.    Anticipated barriers to physical therapy: hardware in spine    Goals: see eval    Plan    Called medicaid and pt is responsible for purchasing of AFO.      Marcella Gómez, PT

## 2020-02-05 ENCOUNTER — CLINICAL SUPPORT (OUTPATIENT)
Dept: REHABILITATION | Facility: HOSPITAL | Age: 50
End: 2020-02-05
Payer: MEDICAID

## 2020-02-05 DIAGNOSIS — R26.9 GAIT ABNORMALITY: ICD-10-CM

## 2020-02-05 DIAGNOSIS — M54.2 NECK PAIN: ICD-10-CM

## 2020-02-05 DIAGNOSIS — M43.6 NECK STIFFNESS: ICD-10-CM

## 2020-02-05 DIAGNOSIS — M62.81 WEAKNESS OF TRUNK MUSCULATURE: ICD-10-CM

## 2020-02-05 DIAGNOSIS — R26.89 BALANCE PROBLEM: ICD-10-CM

## 2020-02-05 PROCEDURE — 97110 THERAPEUTIC EXERCISES: CPT | Mod: PN

## 2020-02-05 NOTE — PROGRESS NOTES
Physical Therapy Daily Treatment Note     Name: Jazmine Causey  Clinic Number: [id number]    Therapy Diagnosis:   Encounter Diagnoses   Name Primary?    Gait abnormality     Balance problem     Weakness of trunk musculature     Neck pain     Neck stiffness      Physician: Jay Jay Doherty MD    Visit Date: 2/5/2020    Physician Orders:   Medical Diagnosis:      Z48.811 (ICD-10-CM) - Encntr for surgical aftcr fol surgery on the nervous sys       Evaluation Date: 12/5/19  Authorization period Expiration: 12/04/2020   Plan of Care Expiration: 3/12/2020    Visit #/Visits authorized: 10/ 12      Time In: 3 PM   Time Out 4 PM  Total Billable Time: 55 minutes    Precautions:hardware in spine, drop foot, pld compression fx to T10/T11    Subjective     Pt reports neck tighness  She was compliant with home exercise program.   Response to previous treatment: improve flexibility  Functional change:     Pain: 1/10  Location: bilateral head , neck  and shoulder      Objective     Jazmine received therapeutic exercises to develop strength, endurance, ROM, flexibility, posture and core stabilization for 55 min minutes including:    DNP  Neuro sheree to improve balance, prevent falls, improve coordination, and prevent abnormal synergy in R ankle x 30 imn  Toe Taps x 3 mins  Side Steps laterally  x 3 mins  Step-Ups x 15  Gait tx in // bars and in clinic on level surface no AD; w ace wrapped simulated afo min manual cueing to reach terminal knee ext, and heel contact; arm swing corrected temporarily w vc;s; min vc's to equalized time in stance as L > than right    Bridges x 15  Ball Squeezes x 3 mins  SLR x 10   Quad. Alt Arm x 10  Quad. Alt Arm/Leg x 10  3 Way Scapular Retraction x 15 with Black T  Cycle x 15 min  Pelvic tilt x 20  BKf x 20  SNCx 20  DKC x 20   Hip add squeezes w ballx 20      NOT PERFORMED THIS SESSION  Unsupervised modalities IFC to R shoulder/periscapula for pain modulation  x 15 min  with ice pack.  Call bell placed within reach and 5 min check to ensure comfort.     Home Exercises Provided and Patient Education Provided : pt sighned released form and given copy in return.   She demonstrated good understanding of side effects.       Education provided:   - safety with transfers and gait, proper lifting technique, positioning for posture, pain managemtent  Written Home Exercises Provided:   Exercises were reviewed and Jazmyne able to demonstrate them prior to the end of the session.  Jazmine demonstrated fair  understanding of the education provided.     Tests and Measures from al 12/12/19  ROM: sitting at rest, lateral tilet to L side  Cervical AROM Pain/Dysfunction with movement   Flexion 50     Extension 50     R Side Bending 30 deg     L Side Bending 15-20     R rotation 25     L rotation 25        Strength:   R hand grjip 35 lbs (dominant)  L hand    30     R hip flex 3/5 (u/a to hold resistance), L hip 4-/5  , idalia knee ext 4/5  R knee flex in sitting 3/5, L knee flex in sitting 3-/5 (poor motor control)  Need to re assess ankle        Assessment   Making progress; recommend AFO    Jazmine is progressing well towards her goals.   Pt prognosis is Excellent.     Pt will continue to benefit from skilled outpatient physical therapy to address the deficits listed in the problem list box on initial evaluation, provide pt/family education and to maximize pt's level of independence in the home and community environment.     Pt's spiritual, cultural and educational needs considered and pt agreeable to plan of care and goals.    Anticipated barriers to physical therapy: hardware in spine    Goals:   Short Term GOALS: 3 weeks  In 3 weeks:  1.  Pt will improve flexibility while dec muscle spasms to improve quality of life  2. Pt will improve strength to improve tolerance and compliance with careplan.   3. Pt will be educated in pain management,  posture, safety techniques, precautions and  HEP.     Long Term GOALS:6-8 weeks  1. Pt will have 0-1/10 pain allowing pt to tolerate working a full day.    2. Pt will improve neck Index score to 20% or less.   3.    Pt will improve Ham balance Score by 5 points or more   3.   Pt will be independent with HEP and SX management     Plan     Discuss discharge plans with patient next session; Supervised face to face visit with Alexis Ca PTA to discuss, current level of function, POC and progress toward goals.     Marcella Góemz, PT

## 2020-02-07 ENCOUNTER — CLINICAL SUPPORT (OUTPATIENT)
Dept: REHABILITATION | Facility: HOSPITAL | Age: 50
End: 2020-02-07
Payer: MEDICAID

## 2020-02-07 DIAGNOSIS — M54.2 NECK PAIN: ICD-10-CM

## 2020-02-07 DIAGNOSIS — R26.89 BALANCE PROBLEM: ICD-10-CM

## 2020-02-07 DIAGNOSIS — M43.6 NECK STIFFNESS: ICD-10-CM

## 2020-02-07 DIAGNOSIS — M62.81 WEAKNESS OF TRUNK MUSCULATURE: ICD-10-CM

## 2020-02-07 DIAGNOSIS — R26.9 GAIT ABNORMALITY: ICD-10-CM

## 2020-02-07 PROCEDURE — 97110 THERAPEUTIC EXERCISES: CPT | Mod: PN

## 2020-02-10 NOTE — PROGRESS NOTES
Physical Therapy Daily Treatment Note     Name: Jazmine Causey  Clinic Number: [id number]    Therapy Diagnosis:   Encounter Diagnoses   Name Primary?    Gait abnormality     Balance problem     Weakness of trunk musculature     Neck pain     Neck stiffness      Physician: Jay Jay Doherty MD    Visit Date: 2/7/2020    Physician Orders:   Medical Diagnosis:      Z48.811 (ICD-10-CM) - Encntr for surgical aftcr fol surgery on the nervous sys       Evaluation Date: 12/5/19  Authorization period Expiration: 12/04/2020   Plan of Care Expiration: 3/12/2020    Visit #/Visits authorized: 11/ 12      Time In: 2 PM   Time Out 3 PM  Total Billable Time: 35 minutes    Precautions:hardware in spine, drop foot, pld compression fx to T10/T11    Subjective     Pt reports neck tighness  She was compliant with home exercise program.   Response to previous treatment: improve flexibility  Functional change:     Pain: 1/10  Location: bilateral head , neck  and shoulder      Objective     Jazmine received therapeutic exercises to develop strength, endurance, ROM, flexibility, posture and core stabilization for 35 min minutes including:     Cycle x 15 min  HS stretch 3 way w QS using strap idalia 10 x each  Heel cord stretch  Standing hip 4 way R and L in // bars 2 x 10 idalia      DNP  Neuro sheree to improve balance, prevent falls, improve coordination, and prevent abnormal synergy in R ankle x 30 imn  Toe Taps x 3 mins  Side Steps laterally  x 3 mins  Step-Ups x 15  Gait tx in // bars and in clinic on level surface no AD; w ace wrapped simulated afo min manual cueing to reach terminal knee ext, and heel contact; arm swing corrected temporarily w vc;s; min vc's to equalized time in stance as L > than right  Bridges x 15  Ball Squeezes x 3 mins  SLR x 10   Quad. Alt Arm x 10  Quad. Alt Arm/Leg x 10  3 Way Scapular Retraction x 15 with Black T  Cycle x 15 min  Pelvic tilt x 20  BKf x 20  SNCx 20  DKC x 20   Hip add  squeezes w ballx 20      NOT PERFORMED THIS SESSION  Unsupervised modalities IFC to R shoulder/periscapula for pain modulation  x 15 min with ice pack.  Call bell placed within reach and 5 min check to ensure comfort.       Education provided:   - safety with transfers and gait, proper lifting technique, positioning for posture, pain managemtent  Written Home Exercises Provided:   Exercises were reviewed and Jazmyne able to demonstrate them prior to the end of the session.  Jazmine demonstrated fair  understanding of the education provided.     Tests and Measures from [facility] 12/12/19  ROM: sitting at rest, lateral tilet to L side  Cervical AROM Pain/Dysfunction with movement   Flexion 50     Extension 50     R Side Bending 30 deg     L Side Bending 15-20     R rotation 25     L rotation 25        Strength:   R hand grjip 35 lbs (dominant)  L hand    30     R hip flex 3/5 (u/a to hold resistance), L hip 4-/5  , idalia knee ext 4/5  R knee flex in sitting 3/5, L knee flex in sitting 3-/5 (poor motor control)  Need to re assess ankle        Assessment   Making progress; recommend AFO; recommend continuing PT     Jazmine is progressing well towards her goals.   Pt prognosis is Excellent.     Pt will continue to benefit from skilled outpatient physical therapy to address the deficits listed in the problem list box on initial evaluation, provide pt/family education and to maximize pt's level of independence in the home and community environment.     Pt's spiritual, cultural and educational needs considered and pt agreeable to plan of care and goals.    Anticipated barriers to physical therapy: hardware in spine    Goals:   Short Term GOALS: 3 weeks  In 3 weeks:  1.  Pt will improve flexibility while dec muscle spasms to improve quality of life  2. Pt will improve strength to improve tolerance and compliance with careplan.   3. Pt will be educated in pain management,  posture, safety techniques, precautions and  HEP.     Long Term GOALS:6-8 weeks  1. Pt will have 0-1/10 pain allowing pt to tolerate working a full day.    2. Pt will improve neck Index score to 20% or less.   3.    Pt will improve Ham balance Score by 5 points or more   3.   Pt will be independent with HEP and SX management     Plan     Discuss discharge plans with patient next session; Supervised face to face visit with Alexis Ca PTA to discuss, current level of function, POC and progress toward goals.     Marcella Gómez, PT

## 2020-02-19 ENCOUNTER — PATIENT OUTREACH (OUTPATIENT)
Dept: ADMINISTRATIVE | Facility: HOSPITAL | Age: 50
End: 2020-02-19

## 2020-02-24 ENCOUNTER — CLINICAL SUPPORT (OUTPATIENT)
Dept: REHABILITATION | Facility: HOSPITAL | Age: 50
End: 2020-02-24
Payer: MEDICAID

## 2020-02-24 DIAGNOSIS — M54.2 NECK PAIN: ICD-10-CM

## 2020-02-24 DIAGNOSIS — M43.6 NECK STIFFNESS: ICD-10-CM

## 2020-02-24 DIAGNOSIS — R26.89 BALANCE PROBLEM: ICD-10-CM

## 2020-02-24 DIAGNOSIS — M62.81 WEAKNESS OF TRUNK MUSCULATURE: ICD-10-CM

## 2020-02-24 DIAGNOSIS — R26.9 GAIT ABNORMALITY: ICD-10-CM

## 2020-02-26 ENCOUNTER — CLINICAL SUPPORT (OUTPATIENT)
Dept: REHABILITATION | Facility: HOSPITAL | Age: 50
End: 2020-02-26
Payer: MEDICAID

## 2020-02-26 DIAGNOSIS — M43.6 NECK STIFFNESS: ICD-10-CM

## 2020-02-26 DIAGNOSIS — R26.9 GAIT ABNORMALITY: ICD-10-CM

## 2020-02-26 DIAGNOSIS — M54.2 NECK PAIN: ICD-10-CM

## 2020-02-26 DIAGNOSIS — R26.89 BALANCE PROBLEM: ICD-10-CM

## 2020-02-26 DIAGNOSIS — M62.81 WEAKNESS OF TRUNK MUSCULATURE: ICD-10-CM

## 2020-02-26 PROCEDURE — 97110 THERAPEUTIC EXERCISES: CPT | Mod: PN

## 2020-02-26 PROCEDURE — 97140 MANUAL THERAPY 1/> REGIONS: CPT | Mod: PN

## 2020-02-27 NOTE — PROGRESS NOTES
Plan of Care Update:                       Physical Therapy Daily Treatment Note     Name: Jazmine Causey  Clinic Number: [id number]    Therapy Diagnosis:   Encounter Diagnoses   Name Primary?    Gait abnormality     Balance problem     Weakness of trunk musculature     Neck pain     Neck stiffness      Physician: Jay Jay Doherty MD    Visit Date: 2/26/2020    Physician Orders:   Medical Diagnosis:      Z48.811 (ICD-10-CM) - Encntr for surgical aftcr fol surgery on the nervous sys       Evaluation Date: 12/5/19  Authorization period Expiration: 12/04/2020   Plan of Care Expiration: 3/12/2020    Visit #/Visits authorized: 12/ 12  (12/20 IF APPROVED)        Time In: 502 PM   Time Out 601 PM  Total Billable Time: 60 minutes  2 manual, 2 TE  Precautions:hardware in spine, drop foot, pld compression fx to T10/T11    Subjective     Pt reports neck tighness  She was compliant with home exercise program.   Response to previous treatment: improve flexibility  Functional change:     Pain: 0/10  Location: bilateral head , neck  and shoulder      Objective     Jazmine received therapeutic exercises to develop strength, endurance, ROM, flexibility, posture and core stabilization for 25  min minutes including:       c spine arom in pain free range  Prom of scapula in sidelying all planes  METs to scapula at 5 angles holding 10 secs    Manual therapy provided x 30  including STM with elongation to B Cervical paraspinals, manual stretch to B UT with STM/MFR to same, B upper thoracic release.    NOT PERFORMED THIS SESSION  Cycle x 15 min  HS stretch 3 way w QS using strap idalia 10 x each  Heel cord stretch  Standing hip 4 way R and L in // bars 2 x 10 idalia  Neuro sheree to improve balance, prevent falls, improve coordination, and prevent abnormal synergy in R ankle x 30 imn  Toe Taps x 3 mins  Side Steps laterally  x 3 mins  Step-Ups x 15  Gait tx in // bars and in clinic on level surface no AD; w  ace wrapped simulated afo min manual cueing to reach terminal knee ext, and heel contact; arm swing corrected temporarily w vc;s; min vc's to equalized time in stance as L > than right  Bridges x 15  Ball Squeezes x 3 mins  SLR x 10   Quad. Alt Arm x 10  Quad. Alt Arm/Leg x 10  3 Way Scapular Retraction x 15 with Black T  Cycle x 15 min  Pelvic tilt x 20  BKf x 20  SNCx 20  DKC x 20   Hip add squeezes w ballx 20      NOT PERFORMED THIS SESSION  Unsupervised modalities IFC to R shoulder/periscapula for pain modulation  x 15 min with ice pack.  Call bell placed within reach and 5 min check to ensure comfort.       Education provided:   - safety with transfers and gait, proper lifting technique, positioning for posture, pain managemtent  Written Home Exercises Provided:   Exercises were reviewed and Isaiass able to demonstrate them prior to the end of the session.  Jazmine demonstrated fair  understanding of the education provided.       Assessment   Patient's progress as been as anticipated.  She continues to have complaints of tightness which she is aware that is expected due to the surgical procedure she had.  She does not have the sensation of pain which is a secondary impairment of her spinal cord injury.  She presents with signs and symptoms of an incomplete spinal cord injury.  She remains a fall risk with the ankle abnormal synergy and LE tone.  Patient would benefit from an AFO, yet her insurance will not cover the cost. I have great concern for this patient. If she were to sustain another fall and having the cervical vertebrae fused at multiple and upper levels, the risk of further damage to the spinal cord is high.        Jazmine is progressing well towards her goals.   Pt prognosis is Excellent.     Pt will continue to benefit from skilled outpatient physical therapy to address the deficits listed in the problem list box on initial evaluation, provide pt/family education and to maximize pt's level of  independence in the home and community environment.     Pt's spiritual, cultural and educational needs considered and pt agreeable to plan of care and goals.    Anticipated barriers to physical therapy: hardware in spine    Goals:   Short Term GOALS: 3 weeks  In 3 weeks:  1.  Pt will improve flexibility while dec muscle spasms to improve quality of lifeMET  2. Pt will improve strength to improve tolerance and compliance with careplan.   3. Pt will be educated in pain management,  posture, safety techniques,MET precautions and HEP.     Long Term GOALS:6-8 weeks  1. Pt will have 0-1/10 pain allowing pt to tolerate working a full day.MET    2. Pt will improve neck Index score to 20% or less. PROGRESSING NOT MET  3. Pt will ambulate without AD on unlevel surface outdoors no LOB independently.  PROGRESSING NOT MET  4. Patient will improve dynamic standing balance to Good + to prevent secondary impairments and decrease fall risk. PROGRESSING NOT MET  3.   Pt will be independent with HEP and SX management PROGRESSING    Plan   Recommend pt continue with PT for 2 x week for 4 weeks working on balance, gait, and motor control. Have encouraged patient to participate in aquatic therapy. Would like to find a venue to obtain an AFO for Ms Causey.   If agreed upon, please MD will sign updated POC.     Marcella Gómez, PT    Attestation:   I have seen the patient, reviewed the therapist's plan of care, and I agree with the plan of care.   I certify the need for these services furnished under this plan of treatment and while under my care.         _______________            ________                                               _____________________  Physician/Referring Practitioner                                                            Date of Signature

## 2020-02-28 ENCOUNTER — CLINICAL SUPPORT (OUTPATIENT)
Dept: REHABILITATION | Facility: HOSPITAL | Age: 50
End: 2020-02-28
Payer: MEDICAID

## 2020-02-28 DIAGNOSIS — M54.2 NECK PAIN: ICD-10-CM

## 2020-02-28 DIAGNOSIS — R26.9 GAIT ABNORMALITY: ICD-10-CM

## 2020-02-28 DIAGNOSIS — M62.81 WEAKNESS OF TRUNK MUSCULATURE: ICD-10-CM

## 2020-02-28 DIAGNOSIS — M43.6 NECK STIFFNESS: ICD-10-CM

## 2020-02-28 DIAGNOSIS — R26.89 BALANCE PROBLEM: ICD-10-CM

## 2020-02-28 PROCEDURE — 97140 MANUAL THERAPY 1/> REGIONS: CPT | Mod: PN

## 2020-02-28 PROCEDURE — 97113 AQUATIC THERAPY/EXERCISES: CPT | Mod: PN

## 2020-02-28 NOTE — PROGRESS NOTES
Plan of Care Update:                       Physical Therapy Daily Treatment Note     Name: Jazmine Causey  Clinic Number: [id number]    Therapy Diagnosis:   Encounter Diagnoses   Name Primary?    Gait abnormality     Balance problem     Weakness of trunk musculature     Neck pain     Neck stiffness      Physician: Jay Jay Doherty MD    Visit Date: 2/28/2020    Physician Orders:   Medical Diagnosis:      Z48.811 (ICD-10-CM) - Encntr for surgical aftcr fol surgery on the nervous sys       Evaluation Date: 12/5/19  Authorization period Expiration: 12/04/2020   Plan of Care Expiration: 3/12/2020    Visit #/Visits authorized: 12/ 12  (13/20        Time In: 345PM   Time Out 500 PM  Total Billable Time: 60 minutes  3 aquatic, 1 manual  Precautions:hardware in spine, drop foot, pld compression fx to T10/T11    Subjective     Pt reports neck tighness  She was compliant with home exercise program.   Response to previous treatment: improve flexibility  Functional change:     Pain: 0/10  Location: bilateral head , neck  and shoulder      Objective       Jazmine received aquatic exercises to develop strength, endurance, ROM, flexibility, posture and core stabilization for 42 minutes including:   Slouch correct w scap retract agaist pool wall x 20  Toe raises  B LE hip flex/aext/abd/add x 10  Mini squats x 10  Warm up walking forward, back albarado and side stepping, toy soldier, heel walking, toe walking and speed walking  5 laps each         Manual therapy provided x 10  including STM with manual stretch to B UT with STM/MFR to same, B upper thoracic release    NOT PERFORMED THIS SESSION  Cycle x 15 min  HS stretch 3 way w QS using strap idalia 10 x each  Heel cord stretch  Standing hip 4 way R and L in // bars 2 x 10 idalia  Neuro sheree to improve balance, prevent falls, improve coordination, and prevent abnormal synergy in R ankle x 30 imn  Toe Taps x 3 mins  Side Steps laterally  x 3  mins  Step-Ups x 15  Gait tx in // bars and in clinic on level surface no AD; w ace wrapped simulated afo min manual cueing to reach terminal knee ext, and heel contact; arm swing corrected temporarily w vc;s; min vc's to equalized time in stance as L > than right  Bridges x 15  Ball Squeezes x 3 mins  SLR x 10   Quad. Alt Arm x 10  Quad. Alt Arm/Leg x 10  3 Way Scapular Retraction x 15 with Black T  Cycle x 15 min  Pelvic tilt x 20  BKf x 20  SNCx 20  DKC x 20   Hip add squeezes w ballx 20      NOT PERFORMED THIS SESSION  Unsupervised modalities IFC to R shoulder/periscapula for pain modulation  x 15 min with ice pack.  Call bell placed within reach and 5 min check to ensure comfort.       Education provided:   - safety with transfers and gait, proper lifting technique, positioning for posture, pain managemtent  Written Home Exercises Provided:   Exercises were reviewed and Jazminezulema able to demonstrate them prior to the end of the session.  Jazmine demonstrated fair  understanding of the education provided.       Assessment   Jazmine did wonderful in the pool today.  wilfredo well with no adverse effects reported.     Patient's progress as been as anticipated.  She continues to have complaints of tightness which she is aware that is expected due to the surgical procedure she had.  She does not have the sensation of pain which is a secondary impairment of her spinal cord injury.  She presents with signs and symptoms of an incomplete spinal cord injury.  She remains a fall risk with the ankle abnormal synergy and LE tone.  Patient would benefit from an AFO, yet her insurance will not cover the cost. I have great concern for this patient. If she were to sustain another fall and having the cervical vertebrae fused at multiple and upper levels, the risk of further damage to the spinal cord is high.        Jazmine is progressing well towards her goals.   Pt prognosis is Excellent.     Pt will continue to benefit from  skilled outpatient physical therapy to address the deficits listed in the problem list box on initial evaluation, provide pt/family education and to maximize pt's level of independence in the home and community environment.     Pt's spiritual, cultural and educational needs considered and pt agreeable to plan of care and goals.    Anticipated barriers to physical therapy: hardware in spine    Goals:   Short Term GOALS: 3 weeks  In 3 weeks:  1.  Pt will improve flexibility while dec muscle spasms to improve quality of lifeMET  2. Pt will improve strength to improve tolerance and compliance with careplan.   3. Pt will be educated in pain management,  posture, safety techniques,MET precautions and HEP.     Long Term GOALS:6-8 weeks  1. Pt will have 0-1/10 pain allowing pt to tolerate working a full day.MET    2. Pt will improve neck Index score to 20% or less. PROGRESSING NOT MET  3. Pt will ambulate without AD on unlevel surface outdoors no LOB independently.  PROGRESSING NOT MET  4. Patient will improve dynamic standing balance to Good + to prevent secondary impairments and decrease fall risk. PROGRESSING NOT MET  3.   Pt will be independent with HEP and SX management PROGRESSING    Plan   AFO    Marcella Gómez, PT

## 2020-02-28 NOTE — PROGRESS NOTES
Late entry for 2/24/20: Patient was not treated. She did make contact stating her child had a seizure that day.  She was rescheduled.

## 2020-03-03 ENCOUNTER — CLINICAL SUPPORT (OUTPATIENT)
Dept: REHABILITATION | Facility: HOSPITAL | Age: 50
End: 2020-03-03
Payer: MEDICAID

## 2020-03-03 DIAGNOSIS — R26.89 BALANCE PROBLEM: ICD-10-CM

## 2020-03-03 DIAGNOSIS — M62.81 WEAKNESS OF TRUNK MUSCULATURE: ICD-10-CM

## 2020-03-03 DIAGNOSIS — R26.9 GAIT ABNORMALITY: ICD-10-CM

## 2020-03-03 DIAGNOSIS — S16.1XXA STRAIN OF NECK MUSCLE, INITIAL ENCOUNTER: ICD-10-CM

## 2020-03-03 DIAGNOSIS — M54.2 NECK PAIN: ICD-10-CM

## 2020-03-03 DIAGNOSIS — M43.6 NECK STIFFNESS: ICD-10-CM

## 2020-03-03 PROCEDURE — 97113 AQUATIC THERAPY/EXERCISES: CPT | Mod: PN

## 2020-03-03 PROCEDURE — 97140 MANUAL THERAPY 1/> REGIONS: CPT | Mod: PN

## 2020-03-03 RX ORDER — MELOXICAM 15 MG/1
15 TABLET ORAL DAILY
Qty: 30 TABLET | Refills: 2 | Status: SHIPPED | OUTPATIENT
Start: 2020-03-03 | End: 2020-05-29

## 2020-03-03 NOTE — PROGRESS NOTES
Plan of Care Update:                       Physical Therapy Daily Treatment Note     Name: Jazmine Causey  Clinic Number: [id number]    Therapy Diagnosis:   Encounter Diagnoses   Name Primary?    Gait abnormality     Balance problem     Weakness of trunk musculature     Neck pain     Neck stiffness      Physician: Jay Jay Doherty MD    Visit Date: 3/3/2020    Physician Orders:   Medical Diagnosis:      Z48.811 (ICD-10-CM) - Encntr for surgical aftcr fol surgery on the nervous sys       Evaluation Date: 12/5/19  Authorization period Expiration: 12/04/2020   Plan of Care Expiration: 3/12/2020    Visit #/Visits authorized: 14/20        Time In: 405PM   Time Out 510 PM  Total Billable Time: 665 minutes  3 aquatic, 1 manual  Precautions:hardware in spine, drop foot, pld compression fx to T10/T11    Subjective     Pt reports near fall today at home over cat bowl.  She was compliant with home exercise program.   Response to previous treatment: improve flexibility  Functional change:     Pain: 0/10  Location: bilateral head , neck  and shoulder      Objective       Jazmine received aquatic exercises to develop strength, endurance, ROM, flexibility, posture and core stabilization for 42 minutes including:   Slouch correct w scap retract agaist pool wall x 20  Toe raises  B LE hip flex/aext/abd/add x 10  Mini squats x 10  Warm up walking forward, back albarado and side stepping, toy soldier, heel walking, toe walking and speed walking  5 laps each         Manual therapy provided x 10  including STM with manual stretch to B UT with STM/MFR to same, B upper thoracic release    NOT PERFORMED THIS SESSION  Cycle x 15 min  HS stretch 3 way w QS using strap idalia 10 x each  Heel cord stretch  Standing hip 4 way R and L in // bars 2 x 10 idalia  Neuro sheree to improve balance, prevent falls, improve coordination, and prevent abnormal synergy in R ankle x 30 imn  Toe Taps x 3 mins  Side Steps laterally   x 3 mins  Step-Ups x 15  Gait tx in // bars and in clinic on level surface no AD; w ace wrapped simulated afo min manual cueing to reach terminal knee ext, and heel contact; arm swing corrected temporarily w vc;s; min vc's to equalized time in stance as L > than right  Bridges x 15  Ball Squeezes x 3 mins  SLR x 10   Quad. Alt Arm x 10  Quad. Alt Arm/Leg x 10  3 Way Scapular Retraction x 15 with Black T  Cycle x 15 min  Pelvic tilt x 20  BKf x 20  SNCx 20  DKC x 20   Hip add squeezes w ballx 20      NOT PERFORMED THIS SESSION  Unsupervised modalities IFC to R shoulder/periscapula for pain modulation  x 15 min with ice pack.  Call bell placed within reach and 5 min check to ensure comfort.       Education provided:   - safety with transfers and gait, proper lifting technique, positioning for posture, pain managemtent  Written Home Exercises Provided:   Exercises were reviewed and Isaiass able to demonstrate them prior to the end of the session.  Jazmine demonstrated fair  understanding of the education provided.       Assessment   Jazmine did wonderful in the pool today.  wilfredo well with no adverse effects reported.     Patient's progress as been as anticipated.  She continues to have complaints of tightness which she is aware that is expected due to the surgical procedure she had.  She does not have the sensation of pain which is a secondary impairment of her spinal cord injury.  She presents with signs and symptoms of an incomplete spinal cord injury.  She remains a fall risk with the ankle abnormal synergy and LE tone.  Patient would benefit from an AFO, yet her insurance will not cover the cost. I have great concern for this patient. If she were to sustain another fall and having the cervical vertebrae fused at multiple and upper levels, the risk of further damage to the spinal cord is high.        Jazmine is progressing well towards her goals.   Pt prognosis is Excellent.     Pt will continue to benefit  from skilled outpatient physical therapy to address the deficits listed in the problem list box on initial evaluation, provide pt/family education and to maximize pt's level of independence in the home and community environment.     Pt's spiritual, cultural and educational needs considered and pt agreeable to plan of care and goals.    Anticipated barriers to physical therapy: hardware in spine    Goals:   Short Term GOALS: 3 weeks  In 3 weeks:  1.  Pt will improve flexibility while dec muscle spasms to improve quality of lifeMET  2. Pt will improve strength to improve tolerance and compliance with careplan.   3. Pt will be educated in pain management,  posture, safety techniques,MET precautions and HEP.     Long Term GOALS:6-8 weeks  1. Pt will have 0-1/10 pain allowing pt to tolerate working a full day.MET    2. Pt will improve neck Index score to 20% or less. PROGRESSING NOT MET  3. Pt will ambulate without AD on unlevel surface outdoors no LOB independently.  PROGRESSING NOT MET  4. Patient will improve dynamic standing balance to Good + to prevent secondary impairments and decrease fall risk. PROGRESSING NOT MET  3.   Pt will be independent with HEP and SX management PROGRESSING    Plan   AFO    Marcella Gómez, PT

## 2020-03-04 ENCOUNTER — HOSPITAL ENCOUNTER (OUTPATIENT)
Dept: RADIOLOGY | Facility: HOSPITAL | Age: 50
Discharge: HOME | End: 2020-03-04
Attending: FAMILY MEDICINE
Payer: MEDICAID

## 2020-03-04 ENCOUNTER — OFFICE VISIT (OUTPATIENT)
Dept: FAMILY MEDICINE | Facility: CLINIC | Age: 50
End: 2020-03-04
Payer: MEDICAID

## 2020-03-04 VITALS
DIASTOLIC BLOOD PRESSURE: 75 MMHG | BODY MASS INDEX: 34.75 KG/M2 | RESPIRATION RATE: 18 BRPM | OXYGEN SATURATION: 97 % | HEART RATE: 65 BPM | SYSTOLIC BLOOD PRESSURE: 111 MMHG | HEIGHT: 63 IN | WEIGHT: 196.13 LBS

## 2020-03-04 DIAGNOSIS — G89.29 CHRONIC BILATERAL LOW BACK PAIN WITHOUT SCIATICA: ICD-10-CM

## 2020-03-04 DIAGNOSIS — G89.29 CHRONIC PAIN OF RIGHT KNEE: ICD-10-CM

## 2020-03-04 DIAGNOSIS — M54.50 CHRONIC BILATERAL LOW BACK PAIN WITHOUT SCIATICA: ICD-10-CM

## 2020-03-04 DIAGNOSIS — E78.49 OTHER HYPERLIPIDEMIA: Primary | ICD-10-CM

## 2020-03-04 DIAGNOSIS — E55.9 VITAMIN D DEFICIENCY: ICD-10-CM

## 2020-03-04 DIAGNOSIS — F51.01 PRIMARY INSOMNIA: ICD-10-CM

## 2020-03-04 DIAGNOSIS — E03.9 HYPOTHYROIDISM, UNSPECIFIED TYPE: ICD-10-CM

## 2020-03-04 DIAGNOSIS — M25.561 CHRONIC PAIN OF RIGHT KNEE: ICD-10-CM

## 2020-03-04 PROCEDURE — 72110 XR LUMBAR SPINE 5 VIEW WITH FLEX AND EXT: ICD-10-PCS | Mod: 26,,, | Performed by: RADIOLOGY

## 2020-03-04 PROCEDURE — 73562 X-RAY EXAM OF KNEE 3: CPT | Mod: TC,PN,RT

## 2020-03-04 PROCEDURE — 99999 PR PBB SHADOW E&M-EST. PATIENT-LVL III: CPT | Mod: PBBFAC,,, | Performed by: FAMILY MEDICINE

## 2020-03-04 PROCEDURE — 73562 XR KNEE 3 VIEW RIGHT: ICD-10-PCS | Mod: 26,RT,, | Performed by: RADIOLOGY

## 2020-03-04 PROCEDURE — 99214 PR OFFICE/OUTPT VISIT, EST, LEVL IV, 30-39 MIN: ICD-10-PCS | Mod: S$PBB,,, | Performed by: FAMILY MEDICINE

## 2020-03-04 PROCEDURE — 72110 X-RAY EXAM L-2 SPINE 4/>VWS: CPT | Mod: TC,PN

## 2020-03-04 PROCEDURE — 99214 OFFICE O/P EST MOD 30 MIN: CPT | Mod: S$PBB,,, | Performed by: FAMILY MEDICINE

## 2020-03-04 PROCEDURE — 99213 OFFICE O/P EST LOW 20 MIN: CPT | Mod: PBBFAC,25,PN | Performed by: FAMILY MEDICINE

## 2020-03-04 PROCEDURE — 99999 PR PBB SHADOW E&M-EST. PATIENT-LVL III: ICD-10-PCS | Mod: PBBFAC,,, | Performed by: FAMILY MEDICINE

## 2020-03-04 PROCEDURE — 72110 X-RAY EXAM L-2 SPINE 4/>VWS: CPT | Mod: 26,,, | Performed by: RADIOLOGY

## 2020-03-04 PROCEDURE — 73562 X-RAY EXAM OF KNEE 3: CPT | Mod: 26,RT,, | Performed by: RADIOLOGY

## 2020-03-04 RX ORDER — HYDROXYZINE HYDROCHLORIDE 25 MG/1
25 TABLET, FILM COATED ORAL 3 TIMES DAILY PRN
Qty: 30 TABLET | Refills: 0 | Status: SHIPPED | OUTPATIENT
Start: 2020-03-04

## 2020-03-04 RX ORDER — LEVOTHYROXINE SODIUM 112 UG/1
112 TABLET ORAL DAILY
Qty: 30 TABLET | Refills: 11 | Status: SHIPPED | OUTPATIENT
Start: 2020-03-04 | End: 2020-03-09 | Stop reason: SDUPTHER

## 2020-03-04 RX ORDER — ERGOCALCIFEROL 1.25 MG/1
50000 CAPSULE ORAL
Qty: 24 CAPSULE | Refills: 0 | Status: SHIPPED | OUTPATIENT
Start: 2020-03-05 | End: 2020-05-29

## 2020-03-04 RX ORDER — ZOLPIDEM TARTRATE 10 MG/1
10 TABLET ORAL NIGHTLY
Qty: 30 TABLET | Refills: 5 | Status: SHIPPED | OUTPATIENT
Start: 2020-03-04 | End: 2022-05-24 | Stop reason: SDUPTHER

## 2020-03-04 RX ORDER — ROSUVASTATIN CALCIUM 10 MG/1
10 TABLET, COATED ORAL DAILY
Qty: 90 TABLET | Refills: 3 | Status: SHIPPED | OUTPATIENT
Start: 2020-03-04 | End: 2021-03-04

## 2020-03-04 NOTE — PROGRESS NOTES
"Subjective:       Patient ID: Jazmine Causey is a 49 y.o. female.    Chief Complaint: Follow-up; Back Pain; Knee Pain (R knee); Otalgia; and Medication Refill    In regards to the patient's dyslipidemia, patient reports compliance with medication regimen without side effects.    Pt states that hypothyroidism is well controlled  No issues/side effects  Compliant with treatment regimen    Pt states that insomnia is well controlled  No issues/side effects  Compliant with treatment regimen    Review of Systems   Constitutional: Negative for activity change, appetite change, chills, fatigue and fever.   HENT: Negative for congestion, dental problem, facial swelling, nosebleeds, postnasal drip, sinus pain, sore throat, trouble swallowing and voice change.    Eyes: Negative for pain, discharge and visual disturbance.   Respiratory: Negative for apnea, cough, chest tightness and shortness of breath.    Cardiovascular: Negative for chest pain and palpitations.   Gastrointestinal: Negative for abdominal pain, blood in stool, constipation and nausea.   Endocrine: Negative for cold intolerance, polydipsia and polyuria.   Genitourinary: Negative for difficulty urinating, enuresis and flank pain.   Musculoskeletal: Positive for arthralgias, back pain, gait problem and myalgias.   Skin: Negative for color change.   Allergic/Immunologic: Negative for environmental allergies and immunocompromised state.   Neurological: Negative for dizziness and light-headedness.   Hematological: Negative for adenopathy.   Psychiatric/Behavioral: Negative for agitation, behavioral problems, decreased concentration and dysphoric mood. The patient is not nervous/anxious.    All other systems reviewed and are negative.        Reviewed family, medical, surgical, and social history.    Objective:      /75 (BP Location: Right arm, Patient Position: Sitting, BP Method: Medium (Automatic))   Pulse 65   Resp 18   Ht 5' 3" (1.6 m)   Wt 89 kg (196 " lb 1.6 oz)   SpO2 97%   BMI 34.74 kg/m²   Physical Exam   Constitutional: She is oriented to person, place, and time. She appears well-developed and well-nourished. No distress.   HENT:   Head: Normocephalic and atraumatic.   Nose: Nose normal.   Mouth/Throat: Oropharynx is clear and moist. No oropharyngeal exudate.   Eyes: Pupils are equal, round, and reactive to light. Conjunctivae and EOM are normal. No scleral icterus.   Neck: Normal range of motion. Neck supple. No thyromegaly present.   Cardiovascular: Normal rate, regular rhythm and normal heart sounds. Exam reveals no gallop and no friction rub.   No murmur heard.  Pulmonary/Chest: Effort normal and breath sounds normal. No respiratory distress. She has no wheezes. She has no rales. She exhibits no tenderness.   Abdominal: Soft. Bowel sounds are normal. She exhibits no distension. There is no tenderness. There is no guarding.   Musculoskeletal: Normal range of motion. She exhibits tenderness and deformity. She exhibits no edema.   Lymphadenopathy:     She has no cervical adenopathy.   Neurological: She is alert and oriented to person, place, and time. She displays normal reflexes. No cranial nerve deficit or sensory deficit. She exhibits normal muscle tone.   Skin: Skin is warm and dry. No rash noted. She is not diaphoretic. No erythema. No pallor.   Psychiatric: She has a normal mood and affect. Her behavior is normal. Judgment and thought content normal.   Nursing note and vitals reviewed.      Assessment:       1. Other hyperlipidemia    2. Vitamin D deficiency    3. Hypothyroidism, unspecified type    4. Primary insomnia    5. Chronic pain of right knee    6. Chronic bilateral low back pain without sciatica        Plan:       Other hyperlipidemia  -     rosuvastatin (CRESTOR) 10 MG tablet; Take 1 tablet (10 mg total) by mouth once daily.  Dispense: 90 tablet; Refill: 3  -     CBC auto differential; Future; Expected date: 03/04/2020  -     Comprehensive  metabolic panel; Future; Expected date: 03/04/2020  -     Microalbumin/creatinine urine ratio; Future  -     Lipid panel; Future; Expected date: 03/04/2020  -     TSH; Future; Expected date: 03/04/2020  -     T4, free; Future; Expected date: 03/04/2020  -     Hemoglobin A1c; Future; Expected date: 03/04/2020    Vitamin D deficiency  -     ergocalciferol (ERGOCALCIFEROL) 50,000 unit Cap; Take 1 capsule (50,000 Units total) by mouth twice a week.  Dispense: 24 capsule; Refill: 0  -     CBC auto differential; Future; Expected date: 03/04/2020  -     Comprehensive metabolic panel; Future; Expected date: 03/04/2020  -     Microalbumin/creatinine urine ratio; Future  -     Lipid panel; Future; Expected date: 03/04/2020  -     TSH; Future; Expected date: 03/04/2020  -     T4, free; Future; Expected date: 03/04/2020  -     Hemoglobin A1c; Future; Expected date: 03/04/2020    Hypothyroidism, unspecified type  -     levothyroxine (SYNTHROID) 112 MCG tablet; Take 1 tablet (112 mcg total) by mouth once daily.  Dispense: 30 tablet; Refill: 11  -     CBC auto differential; Future; Expected date: 03/04/2020  -     Comprehensive metabolic panel; Future; Expected date: 03/04/2020  -     Microalbumin/creatinine urine ratio; Future  -     Lipid panel; Future; Expected date: 03/04/2020  -     TSH; Future; Expected date: 03/04/2020  -     T4, free; Future; Expected date: 03/04/2020  -     Hemoglobin A1c; Future; Expected date: 03/04/2020    Primary insomnia  -     zolpidem (AMBIEN) 10 mg Tab; Take 1 tablet (10 mg total) by mouth every evening.  Dispense: 30 tablet; Refill: 5  -     CBC auto differential; Future; Expected date: 03/04/2020  -     Comprehensive metabolic panel; Future; Expected date: 03/04/2020  -     Microalbumin/creatinine urine ratio; Future  -     Lipid panel; Future; Expected date: 03/04/2020  -     TSH; Future; Expected date: 03/04/2020  -     T4, free; Future; Expected date: 03/04/2020  -     Hemoglobin A1c; Future;  Expected date: 03/04/2020    Chronic pain of right knee  -     X-Ray Knee 3 View Right; Future; Expected date: 03/04/2020    Chronic bilateral low back pain without sciatica  -     X-Ray Lumbar Complete With Flex And Ext; Future; Expected date: 03/04/2020    Other orders  -     hydrOXYzine HCl (ATARAX) 25 MG tablet; Take 1 tablet (25 mg total) by mouth 3 (three) times daily as needed (anxiety).  Dispense: 30 tablet; Refill: 0            Risks, benefits, and side effects were discussed with the patient. All questions were answered to the fullest satisfaction of the patient, and pt verbalized understanding and agreement to treatment plan. Pt was to call with any new or worsening symptoms, or present to the ER.

## 2020-03-09 ENCOUNTER — TELEPHONE (OUTPATIENT)
Dept: ORTHOPEDICS | Facility: CLINIC | Age: 50
End: 2020-03-09

## 2020-03-09 ENCOUNTER — TELEPHONE (OUTPATIENT)
Dept: FAMILY MEDICINE | Facility: CLINIC | Age: 50
End: 2020-03-09

## 2020-03-09 DIAGNOSIS — G89.29 CHRONIC PAIN OF RIGHT KNEE: Primary | ICD-10-CM

## 2020-03-09 DIAGNOSIS — E03.9 HYPOTHYROIDISM, UNSPECIFIED TYPE: ICD-10-CM

## 2020-03-09 DIAGNOSIS — M25.561 CHRONIC PAIN OF RIGHT KNEE: Primary | ICD-10-CM

## 2020-03-09 RX ORDER — LEVOTHYROXINE SODIUM 100 UG/1
100 TABLET ORAL DAILY
Qty: 30 TABLET | Refills: 11 | Status: SHIPPED | OUTPATIENT
Start: 2020-03-09 | End: 2020-06-17

## 2020-03-09 NOTE — TELEPHONE ENCOUNTER
Called patient to schedule referred appointment from Dr. Cloud with Dr. Ramos for treatment of chronic right knee pain.     Appointment made for 03/18/2020 at 10:00 AM at the [facility]. Patient verbalized understanding of appointment date, time, and location.

## 2020-03-09 NOTE — TELEPHONE ENCOUNTER
----- Message from Rhona Ron LPN sent at 3/9/2020 11:24 AM CDT -----  Informed pt of recent lab results per MD communication. Verbalized an understanding.     Pt agrees to Ortho

## 2020-03-18 ENCOUNTER — PATIENT OUTREACH (OUTPATIENT)
Dept: ADMINISTRATIVE | Facility: OTHER | Age: 50
End: 2020-03-18

## 2020-03-18 ENCOUNTER — OFFICE VISIT (OUTPATIENT)
Dept: ORTHOPEDICS | Facility: CLINIC | Age: 50
End: 2020-03-18
Payer: MEDICAID

## 2020-03-18 VITALS
HEART RATE: 75 BPM | WEIGHT: 196.19 LBS | HEIGHT: 63 IN | TEMPERATURE: 97 F | BODY MASS INDEX: 34.76 KG/M2 | RESPIRATION RATE: 18 BRPM | OXYGEN SATURATION: 98 %

## 2020-03-18 DIAGNOSIS — G89.29 CHRONIC PAIN OF RIGHT KNEE: ICD-10-CM

## 2020-03-18 DIAGNOSIS — M21.371 FOOT DROP, RIGHT: Primary | ICD-10-CM

## 2020-03-18 DIAGNOSIS — M25.561 CHRONIC PAIN OF RIGHT KNEE: ICD-10-CM

## 2020-03-18 DIAGNOSIS — M54.10 RADICULAR LOW BACK PAIN: ICD-10-CM

## 2020-03-18 DIAGNOSIS — M21.371 RIGHT FOOT DROP: ICD-10-CM

## 2020-03-18 DIAGNOSIS — M17.11 PRIMARY OSTEOARTHRITIS OF RIGHT KNEE: ICD-10-CM

## 2020-03-18 DIAGNOSIS — M71.21 BAKER CYST, RIGHT: ICD-10-CM

## 2020-03-18 PROCEDURE — 99204 OFFICE O/P NEW MOD 45 MIN: CPT | Mod: S$PBB,,, | Performed by: ORTHOPAEDIC SURGERY

## 2020-03-18 PROCEDURE — 99999 PR PBB SHADOW E&M-EST. PATIENT-LVL III: CPT | Mod: PBBFAC,,, | Performed by: ORTHOPAEDIC SURGERY

## 2020-03-18 PROCEDURE — 99213 OFFICE O/P EST LOW 20 MIN: CPT | Mod: PBBFAC,PN | Performed by: ORTHOPAEDIC SURGERY

## 2020-03-18 PROCEDURE — 99999 PR PBB SHADOW E&M-EST. PATIENT-LVL III: ICD-10-PCS | Mod: PBBFAC,,, | Performed by: ORTHOPAEDIC SURGERY

## 2020-03-18 PROCEDURE — 99204 PR OFFICE/OUTPT VISIT, NEW, LEVL IV, 45-59 MIN: ICD-10-PCS | Mod: S$PBB,,, | Performed by: ORTHOPAEDIC SURGERY

## 2020-03-18 NOTE — LETTER
March 18, 2020      Francisco Javier Cloud MD  [address]  [place] [address]           [facility]  [address]  [place] [address]  Phone: [phone]  Fax: [phone]          Patient: Jazmine Causey   MR Number: [id number]   YOB: 1970   Date of Visit: 3/18/2020       Dear Dr. Francisco Javier Cloud:    Thank you for referring Jazmine Causey to me for evaluation. Attached you will find relevant portions of my assessment and plan of care.    If you have questions, please do not hesitate to call me. I look forward to following Jazmine Causey along with you.    Sincerely,    Trevor Ramos, DO    Enclosure  CC:  No Recipients    If you would like to receive this communication electronically, please contact [email or web address] or [phone] to request more information on [employer] Link access.    For providers and/or their staff who would like to refer a patient to [facility], please contact us through our one-stop-shop provider referral line, [facility] , at [phone].    If you feel you have received this communication in error or would no longer like to receive these types of communications, please e-mail [email or web address]
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
## 2020-05-29 DIAGNOSIS — E55.9 VITAMIN D DEFICIENCY: ICD-10-CM

## 2020-05-29 DIAGNOSIS — S16.1XXA STRAIN OF NECK MUSCLE, INITIAL ENCOUNTER: ICD-10-CM

## 2020-05-29 RX ORDER — ERGOCALCIFEROL 1.25 MG/1
CAPSULE ORAL
Qty: 24 CAPSULE | Refills: 0 | Status: SHIPPED | OUTPATIENT
Start: 2020-05-29 | End: 2020-08-24

## 2020-05-29 RX ORDER — MELOXICAM 15 MG/1
TABLET ORAL
Qty: 30 TABLET | Refills: 2 | Status: SHIPPED | OUTPATIENT
Start: 2020-05-29 | End: 2020-08-26

## 2020-06-10 ENCOUNTER — TELEPHONE (OUTPATIENT)
Dept: FAMILY MEDICINE | Facility: CLINIC | Age: 50
End: 2020-06-10

## 2020-06-17 DIAGNOSIS — G25.81 RESTLESS LEG: ICD-10-CM

## 2020-06-17 DIAGNOSIS — E03.9 HYPOTHYROIDISM, UNSPECIFIED TYPE: ICD-10-CM

## 2020-06-18 RX ORDER — LEVOTHYROXINE SODIUM 125 UG/1
125 TABLET ORAL DAILY
Qty: 30 TABLET | Refills: 0 | Status: SHIPPED | OUTPATIENT
Start: 2020-06-18 | End: 2020-09-28

## 2020-06-18 RX ORDER — ROPINIROLE 1 MG/1
1 TABLET, FILM COATED ORAL NIGHTLY
Qty: 90 TABLET | Refills: 0 | Status: SHIPPED | OUTPATIENT
Start: 2020-06-18 | End: 2021-03-31

## 2020-06-26 ENCOUNTER — TELEPHONE (OUTPATIENT)
Dept: FAMILY MEDICINE | Facility: CLINIC | Age: 50
End: 2020-06-26

## 2020-06-26 NOTE — TELEPHONE ENCOUNTER
----- Message from Rhona Booth sent at 6/26/2020  1:13 PM CDT -----  Regarding: Same Day appt  Contact: pt  Same Day Appt    Patient called and asked if you will be able to see  her today she has  been Nauseated  and  her Right  eye is Red. And chills   Symptoms for the past 3 days       Call back [phone]

## 2020-06-26 NOTE — TELEPHONE ENCOUNTER
Attempted to contact patient in regards to same day appt request. Patient did not answer, and no voicemail was setup to leave a message.

## 2020-09-26 DIAGNOSIS — E03.9 HYPOTHYROIDISM, UNSPECIFIED TYPE: ICD-10-CM

## 2020-09-28 RX ORDER — LEVOTHYROXINE SODIUM 125 UG/1
TABLET ORAL
Qty: 30 TABLET | Refills: 0 | Status: SHIPPED | OUTPATIENT
Start: 2020-09-28 | End: 2020-11-02

## 2020-10-05 ENCOUNTER — PATIENT MESSAGE (OUTPATIENT)
Dept: ADMINISTRATIVE | Facility: HOSPITAL | Age: 50
End: 2020-10-05

## 2020-10-15 ENCOUNTER — HOSPITAL ENCOUNTER (OUTPATIENT)
Dept: RADIOLOGY | Facility: HOSPITAL | Age: 50
Discharge: HOME | End: 2020-10-15
Attending: OBSTETRICS & GYNECOLOGY
Payer: MEDICAID

## 2020-10-15 VITALS — BODY MASS INDEX: 36.49 KG/M2 | WEIGHT: 205.94 LBS | HEIGHT: 63 IN

## 2020-10-15 DIAGNOSIS — Z12.31 ENCOUNTER FOR SCREENING MAMMOGRAM FOR MALIGNANT NEOPLASM OF BREAST: ICD-10-CM

## 2020-10-15 DIAGNOSIS — Z01.419 WELL WOMAN EXAM: ICD-10-CM

## 2020-10-15 PROCEDURE — 77067 SCR MAMMO BI INCL CAD: CPT | Mod: TC

## 2020-10-15 PROCEDURE — 77063 MAMMO DIGITAL SCREENING BILAT WITH TOMO: ICD-10-PCS | Mod: 26,,, | Performed by: RADIOLOGY

## 2020-10-15 PROCEDURE — 77063 BREAST TOMOSYNTHESIS BI: CPT | Mod: 26,,, | Performed by: RADIOLOGY

## 2020-10-15 PROCEDURE — 77067 SCR MAMMO BI INCL CAD: CPT | Mod: 26,,, | Performed by: RADIOLOGY

## 2020-10-15 PROCEDURE — 77067 MAMMO DIGITAL SCREENING BILAT WITH TOMO: ICD-10-PCS | Mod: 26,,, | Performed by: RADIOLOGY

## 2020-11-01 DIAGNOSIS — E03.9 HYPOTHYROIDISM, UNSPECIFIED TYPE: ICD-10-CM

## 2020-11-02 RX ORDER — LEVOTHYROXINE SODIUM 125 UG/1
TABLET ORAL
Qty: 30 TABLET | Refills: 11 | Status: SHIPPED | OUTPATIENT
Start: 2020-11-02 | End: 2021-04-14

## 2020-11-03 RX ORDER — TIZANIDINE 4 MG/1
4 TABLET ORAL EVERY 8 HOURS
Qty: 90 TABLET | Refills: 11 | Status: SHIPPED | OUTPATIENT
Start: 2020-11-03 | End: 2021-09-16

## 2020-11-13 ENCOUNTER — TELEPHONE (OUTPATIENT)
Dept: FAMILY MEDICINE | Facility: CLINIC | Age: 50
End: 2020-11-13

## 2020-11-13 NOTE — TELEPHONE ENCOUNTER
----- Message from Odilia Kelli sent at 11/13/2020 10:56 AM CST -----  Regarding: muscle spasm  Contact: pt  Type: Needs Medical Advice    Who Called:      Best Call Back Number:   Additional Information: Requesting a call back from Nurse, regarding pt is having bad muscle spasms and needs blood work ,please place orders

## 2020-11-25 RX ORDER — AZITHROMYCIN 250 MG/1
TABLET, FILM COATED ORAL
Qty: 6 TABLET | Refills: 0 | Status: SHIPPED | OUTPATIENT
Start: 2020-11-25 | End: 2021-03-31

## 2021-02-03 ENCOUNTER — PATIENT MESSAGE (OUTPATIENT)
Dept: ADMINISTRATIVE | Facility: HOSPITAL | Age: 51
End: 2021-02-03

## 2021-03-31 ENCOUNTER — LAB VISIT (OUTPATIENT)
Dept: LAB | Facility: HOSPITAL | Age: 51
End: 2021-03-31
Attending: FAMILY MEDICINE
Payer: MEDICAID

## 2021-03-31 ENCOUNTER — OFFICE VISIT (OUTPATIENT)
Dept: FAMILY MEDICINE | Facility: CLINIC | Age: 51
End: 2021-03-31
Payer: MEDICAID

## 2021-03-31 VITALS
DIASTOLIC BLOOD PRESSURE: 76 MMHG | HEART RATE: 86 BPM | WEIGHT: 195 LBS | OXYGEN SATURATION: 96 % | HEIGHT: 63 IN | BODY MASS INDEX: 34.55 KG/M2 | RESPIRATION RATE: 16 BRPM | SYSTOLIC BLOOD PRESSURE: 117 MMHG

## 2021-03-31 DIAGNOSIS — E03.9 HYPOTHYROIDISM, UNSPECIFIED TYPE: ICD-10-CM

## 2021-03-31 DIAGNOSIS — M54.10 RADICULAR LOW BACK PAIN: ICD-10-CM

## 2021-03-31 DIAGNOSIS — Z00.00 ANNUAL PHYSICAL EXAM: ICD-10-CM

## 2021-03-31 DIAGNOSIS — E55.9 VITAMIN D DEFICIENCY: ICD-10-CM

## 2021-03-31 DIAGNOSIS — Z00.00 ANNUAL PHYSICAL EXAM: Primary | ICD-10-CM

## 2021-03-31 DIAGNOSIS — Z11.59 NEED FOR HEPATITIS C SCREENING TEST: ICD-10-CM

## 2021-03-31 DIAGNOSIS — Z12.11 COLON CANCER SCREENING: ICD-10-CM

## 2021-03-31 DIAGNOSIS — E78.49 OTHER HYPERLIPIDEMIA: ICD-10-CM

## 2021-03-31 DIAGNOSIS — S83.8X1A INJURY OF MENISCUS OF RIGHT KNEE, INITIAL ENCOUNTER: ICD-10-CM

## 2021-03-31 LAB
ALBUMIN SERPL BCP-MCNC: 4.7 G/DL (ref 3.5–5.2)
ALP SERPL-CCNC: 62 U/L (ref 55–135)
ALT SERPL W/O P-5'-P-CCNC: 31 U/L (ref 10–44)
ANION GAP SERPL CALC-SCNC: 8 MMOL/L (ref 8–16)
AST SERPL-CCNC: 25 U/L (ref 10–40)
BASOPHILS # BLD AUTO: 0.06 K/UL (ref 0–0.2)
BASOPHILS NFR BLD: 1 % (ref 0–1.9)
BILIRUB SERPL-MCNC: 0.6 MG/DL (ref 0.1–1)
BUN SERPL-MCNC: 14 MG/DL (ref 6–20)
CALCIUM SERPL-MCNC: 9.8 MG/DL (ref 8.7–10.5)
CHLORIDE SERPL-SCNC: 104 MMOL/L (ref 95–110)
CHOLEST SERPL-MCNC: 225 MG/DL (ref 120–199)
CHOLEST/HDLC SERPL: 4.5 {RATIO} (ref 2–5)
CO2 SERPL-SCNC: 26 MMOL/L (ref 23–29)
CREAT SERPL-MCNC: 0.7 MG/DL (ref 0.5–1.4)
DIFFERENTIAL METHOD: NORMAL
EOSINOPHIL # BLD AUTO: 0.1 K/UL (ref 0–0.5)
EOSINOPHIL NFR BLD: 1.3 % (ref 0–8)
ERYTHROCYTE [DISTWIDTH] IN BLOOD BY AUTOMATED COUNT: 12.7 % (ref 11.5–14.5)
EST. GFR  (AFRICAN AMERICAN): >60 ML/MIN/1.73 M^2
EST. GFR  (NON AFRICAN AMERICAN): >60 ML/MIN/1.73 M^2
ESTIMATED AVG GLUCOSE: 123 MG/DL (ref 68–131)
GLUCOSE SERPL-MCNC: 95 MG/DL (ref 70–110)
HBA1C MFR BLD: 5.9 % (ref 4.5–6.2)
HCT VFR BLD AUTO: 40.7 % (ref 37–48.5)
HDLC SERPL-MCNC: 50 MG/DL (ref 40–75)
HDLC SERPL: 22.2 % (ref 20–50)
HGB BLD-MCNC: 13.7 G/DL (ref 12–16)
IMM GRANULOCYTES # BLD AUTO: 0.01 K/UL (ref 0–0.04)
IMM GRANULOCYTES NFR BLD AUTO: 0.2 % (ref 0–0.5)
LDLC SERPL CALC-MCNC: 151.4 MG/DL (ref 63–159)
LYMPHOCYTES # BLD AUTO: 2.6 K/UL (ref 1–4.8)
LYMPHOCYTES NFR BLD: 42.3 % (ref 18–48)
MCH RBC QN AUTO: 28.8 PG (ref 27–31)
MCHC RBC AUTO-ENTMCNC: 33.7 G/DL (ref 32–36)
MCV RBC AUTO: 86 FL (ref 82–98)
MONOCYTES # BLD AUTO: 0.4 K/UL (ref 0.3–1)
MONOCYTES NFR BLD: 6.6 % (ref 4–15)
NEUTROPHILS # BLD AUTO: 3 K/UL (ref 1.8–7.7)
NEUTROPHILS NFR BLD: 48.6 % (ref 38–73)
NONHDLC SERPL-MCNC: 175 MG/DL
NRBC BLD-RTO: 0 /100 WBC
PLATELET # BLD AUTO: 303 K/UL (ref 150–450)
PMV BLD AUTO: 9.4 FL (ref 9.2–12.9)
POTASSIUM SERPL-SCNC: 3.9 MMOL/L (ref 3.5–5.1)
PROT SERPL-MCNC: 8.4 G/DL (ref 6–8.4)
RBC # BLD AUTO: 4.75 M/UL (ref 4–5.4)
SODIUM SERPL-SCNC: 138 MMOL/L (ref 136–145)
T4 FREE SERPL-MCNC: 1.34 NG/DL (ref 0.71–1.51)
TRIGL SERPL-MCNC: 118 MG/DL (ref 30–150)
TSH SERPL DL<=0.005 MIU/L-ACNC: 0.02 UIU/ML (ref 0.34–5.6)
WBC # BLD AUTO: 6.19 K/UL (ref 3.9–12.7)

## 2021-03-31 PROCEDURE — 99396 PREV VISIT EST AGE 40-64: CPT | Mod: S$PBB,,, | Performed by: FAMILY MEDICINE

## 2021-03-31 PROCEDURE — 84439 ASSAY OF FREE THYROXINE: CPT | Performed by: FAMILY MEDICINE

## 2021-03-31 PROCEDURE — 86803 HEPATITIS C AB TEST: CPT | Performed by: FAMILY MEDICINE

## 2021-03-31 PROCEDURE — 99999 PR PBB SHADOW E&M-EST. PATIENT-LVL IV: CPT | Mod: PBBFAC,,, | Performed by: FAMILY MEDICINE

## 2021-03-31 PROCEDURE — 99999 PR PBB SHADOW E&M-EST. PATIENT-LVL IV: ICD-10-PCS | Mod: PBBFAC,,, | Performed by: FAMILY MEDICINE

## 2021-03-31 PROCEDURE — 99214 OFFICE O/P EST MOD 30 MIN: CPT | Mod: PBBFAC,PN | Performed by: FAMILY MEDICINE

## 2021-03-31 PROCEDURE — 80061 LIPID PANEL: CPT | Performed by: FAMILY MEDICINE

## 2021-03-31 PROCEDURE — 36415 COLL VENOUS BLD VENIPUNCTURE: CPT | Performed by: FAMILY MEDICINE

## 2021-03-31 PROCEDURE — 99396 PR PREVENTIVE VISIT,EST,40-64: ICD-10-PCS | Mod: S$PBB,,, | Performed by: FAMILY MEDICINE

## 2021-03-31 PROCEDURE — 85025 COMPLETE CBC W/AUTO DIFF WBC: CPT | Performed by: FAMILY MEDICINE

## 2021-03-31 PROCEDURE — 83036 HEMOGLOBIN GLYCOSYLATED A1C: CPT | Performed by: FAMILY MEDICINE

## 2021-03-31 PROCEDURE — 80053 COMPREHEN METABOLIC PANEL: CPT | Performed by: FAMILY MEDICINE

## 2021-03-31 PROCEDURE — 84443 ASSAY THYROID STIM HORMONE: CPT | Performed by: FAMILY MEDICINE

## 2021-04-01 LAB — HCV AB SERPL QL IA: NEGATIVE

## 2021-04-08 ENCOUNTER — TELEPHONE (OUTPATIENT)
Dept: FAMILY MEDICINE | Facility: CLINIC | Age: 51
End: 2021-04-08

## 2021-04-12 RX ORDER — LEVOTHYROXINE SODIUM 100 UG/1
100 TABLET ORAL
Qty: 30 TABLET | Refills: 11 | OUTPATIENT
Start: 2021-04-12

## 2021-04-14 ENCOUNTER — TELEPHONE (OUTPATIENT)
Dept: FAMILY MEDICINE | Facility: CLINIC | Age: 51
End: 2021-04-14

## 2021-04-14 RX ORDER — LEVOTHYROXINE SODIUM 88 UG/1
88 TABLET ORAL
Qty: 90 TABLET | Refills: 1 | Status: SHIPPED | OUTPATIENT
Start: 2021-04-14 | End: 2021-09-09

## 2021-09-09 RX ORDER — LEVOTHYROXINE SODIUM 88 UG/1
TABLET ORAL
Qty: 90 TABLET | Refills: 3 | Status: SHIPPED | OUTPATIENT
Start: 2021-09-09 | End: 2022-05-24 | Stop reason: SDUPTHER

## 2021-10-06 ENCOUNTER — TELEPHONE (OUTPATIENT)
Dept: FAMILY MEDICINE | Facility: CLINIC | Age: 51
End: 2021-10-06

## 2021-10-06 DIAGNOSIS — Z12.31 ENCOUNTER FOR SCREENING MAMMOGRAM FOR MALIGNANT NEOPLASM OF BREAST: Primary | ICD-10-CM

## 2021-10-18 ENCOUNTER — HOSPITAL ENCOUNTER (OUTPATIENT)
Dept: RADIOLOGY | Facility: HOSPITAL | Age: 51
Discharge: HOME | End: 2021-10-18
Attending: FAMILY MEDICINE
Payer: MEDICAID

## 2021-10-18 VITALS — BODY MASS INDEX: 34.38 KG/M2 | WEIGHT: 194 LBS | HEIGHT: 63 IN

## 2021-10-18 DIAGNOSIS — L98.8 SKIN LESION OF BREAST: ICD-10-CM

## 2021-10-18 DIAGNOSIS — Z12.31 ENCOUNTER FOR SCREENING MAMMOGRAM FOR MALIGNANT NEOPLASM OF BREAST: ICD-10-CM

## 2021-10-18 DIAGNOSIS — N63.0 PALPABLE MASS OF BREAST: ICD-10-CM

## 2022-03-18 ENCOUNTER — PATIENT MESSAGE (OUTPATIENT)
Dept: ADMINISTRATIVE | Facility: HOSPITAL | Age: 52
End: 2022-03-18
Payer: MEDICAID

## 2022-04-08 ENCOUNTER — TELEPHONE (OUTPATIENT)
Dept: FAMILY MEDICINE | Facility: CLINIC | Age: 52
End: 2022-04-08
Payer: MEDICAID

## 2022-04-08 NOTE — TELEPHONE ENCOUNTER
Called the patient to schedule a PCP visit, it has been about a year since we seen them. The patient scheduled.

## 2022-05-17 ENCOUNTER — OFFICE VISIT (OUTPATIENT)
Dept: FAMILY MEDICINE | Facility: CLINIC | Age: 52
End: 2022-05-17
Payer: MEDICAID

## 2022-05-17 VITALS
SYSTOLIC BLOOD PRESSURE: 154 MMHG | OXYGEN SATURATION: 97 % | HEART RATE: 74 BPM | WEIGHT: 192.19 LBS | RESPIRATION RATE: 19 BRPM | HEIGHT: 62 IN | DIASTOLIC BLOOD PRESSURE: 91 MMHG | BODY MASS INDEX: 35.37 KG/M2

## 2022-05-17 DIAGNOSIS — J02.9 ACUTE SORE THROAT: ICD-10-CM

## 2022-05-17 DIAGNOSIS — E78.5 HYPERLIPIDEMIA, UNSPECIFIED HYPERLIPIDEMIA TYPE: ICD-10-CM

## 2022-05-17 DIAGNOSIS — J32.9 SINUSITIS, UNSPECIFIED CHRONICITY, UNSPECIFIED LOCATION: Primary | ICD-10-CM

## 2022-05-17 DIAGNOSIS — Z20.818 STREPTOCOCCUS EXPOSURE: ICD-10-CM

## 2022-05-17 DIAGNOSIS — Z12.11 COLON CANCER SCREENING: ICD-10-CM

## 2022-05-17 DIAGNOSIS — R53.83 FATIGUE, UNSPECIFIED TYPE: ICD-10-CM

## 2022-05-17 DIAGNOSIS — F17.200 TOBACCO DEPENDENCE: ICD-10-CM

## 2022-05-17 DIAGNOSIS — Z11.4 ENCOUNTER FOR SCREENING FOR HIV: ICD-10-CM

## 2022-05-17 DIAGNOSIS — E03.9 HYPOTHYROIDISM, UNSPECIFIED TYPE: ICD-10-CM

## 2022-05-17 DIAGNOSIS — J34.89 RHINORRHEA: ICD-10-CM

## 2022-05-17 PROCEDURE — 99999 PR PBB SHADOW E&M-EST. PATIENT-LVL IV: ICD-10-PCS | Mod: PBBFAC,,,

## 2022-05-17 PROCEDURE — 1159F PR MEDICATION LIST DOCUMENTED IN MEDICAL RECORD: ICD-10-PCS | Mod: CPTII,,,

## 2022-05-17 PROCEDURE — 99214 OFFICE O/P EST MOD 30 MIN: CPT | Mod: PBBFAC,PN

## 2022-05-17 PROCEDURE — 3080F DIAST BP >= 90 MM HG: CPT | Mod: CPTII,,,

## 2022-05-17 PROCEDURE — 1159F MED LIST DOCD IN RCRD: CPT | Mod: CPTII,,,

## 2022-05-17 PROCEDURE — 3008F PR BODY MASS INDEX (BMI) DOCUMENTED: ICD-10-PCS | Mod: CPTII,,,

## 2022-05-17 PROCEDURE — 99999 PR PBB SHADOW E&M-EST. PATIENT-LVL IV: CPT | Mod: PBBFAC,,,

## 2022-05-17 PROCEDURE — 99214 OFFICE O/P EST MOD 30 MIN: CPT | Mod: S$PBB,,,

## 2022-05-17 PROCEDURE — 3008F BODY MASS INDEX DOCD: CPT | Mod: CPTII,,,

## 2022-05-17 PROCEDURE — 3080F PR MOST RECENT DIASTOLIC BLOOD PRESSURE >= 90 MM HG: ICD-10-PCS | Mod: CPTII,,,

## 2022-05-17 PROCEDURE — 99214 PR OFFICE/OUTPT VISIT, EST, LEVL IV, 30-39 MIN: ICD-10-PCS | Mod: S$PBB,,,

## 2022-05-17 PROCEDURE — 3077F PR MOST RECENT SYSTOLIC BLOOD PRESSURE >= 140 MM HG: ICD-10-PCS | Mod: CPTII,,,

## 2022-05-17 PROCEDURE — 3077F SYST BP >= 140 MM HG: CPT | Mod: CPTII,,,

## 2022-05-17 RX ORDER — AMOXICILLIN 500 MG/1
500 TABLET, FILM COATED ORAL EVERY 12 HOURS
Qty: 20 TABLET | Refills: 0 | Status: SHIPPED | OUTPATIENT
Start: 2022-05-17 | End: 2022-05-27

## 2022-05-17 NOTE — PROGRESS NOTES
"HISTORY OF PRESENT ILLNESS:  Jazmine Causey is a 51 y.o. female who presents to the clinic today for Sinusitis (Stated the whole left side of her face and ear is stuffy, nose is runny, has cold sores, headache yesterday. Runny nose, clear mucus/Pt consents to labs, declines tetanus and shingles vaccine, pt consents to cologuard )  .     Sinus congestion started two days ago, left sided facial pain and left ear pain, mild sore throat and rhinorrhea with clear drainage.  Denies any fever. Reports son recently had strep throat last week.    Started smoking again about 6 months ago would like smoking cessation program. States she gains weight when she quits and would like to be on phentermine.     Has annual scheduled with Dr. Cloud next Tuesday. Will order labs to be done prior.     REVIEW OF SYSTEMS:  Review of Systems   Constitutional: Positive for fatigue. Negative for chills and fever.   HENT: Positive for ear pain, postnasal drip, rhinorrhea, sinus pressure, sinus pain and sore throat. Negative for congestion.    Eyes: Negative for discharge.   Respiratory: Negative for cough and shortness of breath.    Cardiovascular: Negative for chest pain and leg swelling.   Gastrointestinal: Negative for abdominal pain, constipation, diarrhea, nausea and vomiting.   Genitourinary: Negative for difficulty urinating.   Skin: Positive for rash.        Under nose and upper lip since nose started running.   Neurological: Positive for headaches. Negative for dizziness.   All other systems reviewed and are negative.        PHYSICAL EXAM:   Vitals:    05/17/22 1357 05/17/22 1358   BP: (!) 167/81 (!) 154/91   BP Location: Left arm Right arm   Patient Position: Sitting Sitting   BP Method: Small (Automatic) Small (Automatic)   Pulse: 74    Resp: 19    SpO2: 97%    Weight: 87.2 kg (192 lb 3.2 oz)    Height: 5' 2" (1.575 m)       Weight: 87.2 kg (192 lb 3.2 oz)   Height: 5' 2" (157.5 cm)   Body mass index is 35.15 kg/m².     Physical " Exam  Vitals and nursing note reviewed.   Constitutional:       Appearance: Normal appearance. She is obese.   HENT:      Head: Normocephalic and atraumatic.      Left Ear: Tympanic membrane is erythematous.      Nose:      Left Turbinates: Swollen.      Left Sinus: Maxillary sinus tenderness and frontal sinus tenderness present.      Mouth/Throat:      Mouth: Mucous membranes are moist.      Pharynx: Posterior oropharyngeal erythema present.   Eyes:      Pupils: Pupils are equal, round, and reactive to light.   Cardiovascular:      Rate and Rhythm: Normal rate and regular rhythm.      Heart sounds: Normal heart sounds.   Pulmonary:      Effort: Pulmonary effort is normal.      Breath sounds: Normal breath sounds.   Abdominal:      General: Abdomen is flat.   Musculoskeletal:         General: Normal range of motion.      Cervical back: Normal range of motion.   Skin:     General: Skin is warm and dry.   Neurological:      Mental Status: She is alert and oriented to person, place, and time.   Psychiatric:         Mood and Affect: Mood normal.         Behavior: Behavior normal.         Thought Content: Thought content normal.         Judgment: Judgment normal.          ALLERGIES AND MEDICATIONS: updated and reviewed.  Review of patient's allergies indicates:   Allergen Reactions    Cephalexin      Medication List with Changes/Refills   New Medications    AMOXICILLIN (AMOXIL) 500 MG TAB    Take 1 tablet (500 mg total) by mouth every 12 (twelve) hours. for 10 days   Current Medications    ALBUTEROL 90 MCG/ACTUATION INHALER    Inhale 1-2 puffs into the lungs every 6 (six) hours as needed for Wheezing. Rescue    CLOTRIMAZOLE-BETAMETHASONE 1-0.05% (LOTRISONE) CREAM    Apply topically 2 (two) times daily.    ERGOCALCIFEROL (ERGOCALCIFEROL) 50,000 UNIT CAP    TAKE 1 CAPSULE BY MOUTH 2 TIMES A WEEK    HYDROXYZINE HCL (ATARAX) 25 MG TABLET    Take 1 tablet (25 mg total) by mouth 3 (three) times daily as needed (anxiety).     LEVOTHYROXINE (SYNTHROID) 88 MCG TABLET    TAKE 1 TABLET(88 MCG) BY MOUTH BEFORE BREAKFAST    POLYMYXIN B SULF-TRIMETHOPRIM (POLYTRIM) 10,000 UNIT- 1 MG/ML DROP    Place 1 drop into the right eye every 6 (six) hours.    PROMETHAZINE (PHENERGAN) 25 MG TABLET    Take 1 tablet (25 mg total) by mouth every 4 (four) hours as needed for Nausea.    ROSUVASTATIN (CRESTOR) 10 MG TABLET    Take 1 tablet (10 mg total) by mouth once daily.    TIZANIDINE (ZANAFLEX) 4 MG TABLET    TAKE 1 TABLET(4 MG) BY MOUTH EVERY 8 HOURS    ZOLPIDEM (AMBIEN) 10 MG TAB    Take 1 tablet (10 mg total) by mouth every evening.        ASSESSMENT AND PLAN:  Jazmine was seen today for sinusitis.    Diagnoses and all orders for this visit:    Sinusitis, unspecified chronicity, unspecified location  -     amoxicillin (AMOXIL) 500 MG Tab; Take 1 tablet (500 mg total) by mouth every 12 (twelve) hours. for 10 days    Streptococcus exposure  -     POCT rapid strep A  -     amoxicillin (AMOXIL) 500 MG Tab; Take 1 tablet (500 mg total) by mouth every 12 (twelve) hours. for 10 days    Colon cancer screening  -     Cologuard Screening (Multitarget Stool DNA); Future  -     Cologuard Screening (Multitarget Stool DNA)    Hypothyroidism, unspecified type  -     CBC Auto Differential; Future  -     Comprehensive Metabolic Panel; Future  -     Hemoglobin A1C; Future  -     HIV 1/2 Ag/Ab (4th Gen); Future  -     Lipid Panel; Future  -     T4, Free; Future  -     TSH; Future  -     Vitamin D; Future    Hyperlipidemia, unspecified hyperlipidemia type  -     Lipid Panel; Future    Rhinorrhea  -     amoxicillin (AMOXIL) 500 MG Tab; Take 1 tablet (500 mg total) by mouth every 12 (twelve) hours. for 10 days    Encounter for screening for HIV  -     HIV 1/2 Ag/Ab (4th Gen); Future    Fatigue, unspecified type  -     CBC Auto Differential; Future  -     Comprehensive Metabolic Panel; Future  -     Hemoglobin A1C; Future  -     Lipid Panel; Future  -     T4, Free; Future  -      TSH; Future  -     Vitamin D; Future    Acute sore throat  -     amoxicillin (AMOXIL) 500 MG Tab; Take 1 tablet (500 mg total) by mouth every 12 (twelve) hours. for 10 days    Tobacco dependence  -     Ambulatory referral/consult to Smoking Cessation Program; Future         PLAN:  - Discussed with patient the plan of care  - Medications reviewed. Medication side effects discussed. Patient has no questions or concerns at this time.  - Reviewed, monitored, evaluated, and assessed chronic conditions as outlined above  - Informed patient to please notify me with any questions or concerns at anytime  - f/u scheduled with Dr. Cloud for next Tuesday    Risks, benefits, and side effects were discussed with the patient. All questions were answered to the fullest satisfaction of the patient, and pt verbalized understanding and agreement to treatment plan. Pt was to call with any new or worsening symptoms, or present to the ER.      Thank you,  ROSALEE Milligan

## 2022-05-18 ENCOUNTER — LAB VISIT (OUTPATIENT)
Dept: LAB | Facility: HOSPITAL | Age: 52
End: 2022-05-18
Payer: MEDICAID

## 2022-05-18 DIAGNOSIS — E78.5 HYPERLIPIDEMIA, UNSPECIFIED HYPERLIPIDEMIA TYPE: ICD-10-CM

## 2022-05-18 DIAGNOSIS — Z11.4 ENCOUNTER FOR SCREENING FOR HIV: ICD-10-CM

## 2022-05-18 DIAGNOSIS — E03.9 HYPOTHYROIDISM, UNSPECIFIED TYPE: ICD-10-CM

## 2022-05-18 DIAGNOSIS — R53.83 FATIGUE, UNSPECIFIED TYPE: ICD-10-CM

## 2022-05-18 LAB
25(OH)D3+25(OH)D2 SERPL-MCNC: 24 NG/ML (ref 30–96)
ALBUMIN SERPL BCP-MCNC: 4 G/DL (ref 3.5–5.2)
ALP SERPL-CCNC: 61 U/L (ref 55–135)
ALT SERPL W/O P-5'-P-CCNC: 38 U/L (ref 10–44)
ANION GAP SERPL CALC-SCNC: 11 MMOL/L (ref 8–16)
AST SERPL-CCNC: 27 U/L (ref 10–40)
BASOPHILS # BLD AUTO: 0.08 K/UL (ref 0–0.2)
BASOPHILS NFR BLD: 1.3 % (ref 0–1.9)
BILIRUB SERPL-MCNC: 0.3 MG/DL (ref 0.1–1)
BUN SERPL-MCNC: 12 MG/DL (ref 6–20)
CALCIUM SERPL-MCNC: 9.4 MG/DL (ref 8.7–10.5)
CHLORIDE SERPL-SCNC: 106 MMOL/L (ref 95–110)
CHOLEST SERPL-MCNC: 188 MG/DL (ref 120–199)
CHOLEST/HDLC SERPL: 5.2 {RATIO} (ref 2–5)
CO2 SERPL-SCNC: 22 MMOL/L (ref 23–29)
CREAT SERPL-MCNC: 0.7 MG/DL (ref 0.5–1.4)
DIFFERENTIAL METHOD: NORMAL
EOSINOPHIL # BLD AUTO: 0.1 K/UL (ref 0–0.5)
EOSINOPHIL NFR BLD: 2 % (ref 0–8)
ERYTHROCYTE [DISTWIDTH] IN BLOOD BY AUTOMATED COUNT: 13.2 % (ref 11.5–14.5)
EST. GFR  (AFRICAN AMERICAN): >60 ML/MIN/1.73 M^2
EST. GFR  (NON AFRICAN AMERICAN): >60 ML/MIN/1.73 M^2
ESTIMATED AVG GLUCOSE: 111 MG/DL (ref 68–131)
GLUCOSE SERPL-MCNC: 95 MG/DL (ref 70–110)
HBA1C MFR BLD: 5.5 % (ref 4–5.6)
HCT VFR BLD AUTO: 40.6 % (ref 37–48.5)
HDLC SERPL-MCNC: 36 MG/DL (ref 40–75)
HDLC SERPL: 19.1 % (ref 20–50)
HGB BLD-MCNC: 13.7 G/DL (ref 12–16)
IMM GRANULOCYTES # BLD AUTO: 0.01 K/UL (ref 0–0.04)
IMM GRANULOCYTES NFR BLD AUTO: 0.2 % (ref 0–0.5)
LDLC SERPL CALC-MCNC: 127.2 MG/DL (ref 63–159)
LYMPHOCYTES # BLD AUTO: 2.2 K/UL (ref 1–4.8)
LYMPHOCYTES NFR BLD: 36 % (ref 18–48)
MCH RBC QN AUTO: 29.8 PG (ref 27–31)
MCHC RBC AUTO-ENTMCNC: 33.7 G/DL (ref 32–36)
MCV RBC AUTO: 88 FL (ref 82–98)
MONOCYTES # BLD AUTO: 0.5 K/UL (ref 0.3–1)
MONOCYTES NFR BLD: 7.7 % (ref 4–15)
NEUTROPHILS # BLD AUTO: 3.2 K/UL (ref 1.8–7.7)
NEUTROPHILS NFR BLD: 52.8 % (ref 38–73)
NONHDLC SERPL-MCNC: 152 MG/DL
NRBC BLD-RTO: 0 /100 WBC
PLATELET # BLD AUTO: 254 K/UL (ref 150–450)
PMV BLD AUTO: 9.8 FL (ref 9.2–12.9)
POTASSIUM SERPL-SCNC: 3.8 MMOL/L (ref 3.5–5.1)
PROT SERPL-MCNC: 7.5 G/DL (ref 6–8.4)
RBC # BLD AUTO: 4.6 M/UL (ref 4–5.4)
SODIUM SERPL-SCNC: 139 MMOL/L (ref 136–145)
T4 FREE SERPL-MCNC: 1.07 NG/DL (ref 0.71–1.51)
TRIGL SERPL-MCNC: 124 MG/DL (ref 30–150)
TSH SERPL DL<=0.005 MIU/L-ACNC: 7.72 UIU/ML (ref 0.4–4)
WBC # BLD AUTO: 6.09 K/UL (ref 3.9–12.7)

## 2022-05-18 PROCEDURE — 36415 COLL VENOUS BLD VENIPUNCTURE: CPT

## 2022-05-18 PROCEDURE — 85025 COMPLETE CBC W/AUTO DIFF WBC: CPT

## 2022-05-18 PROCEDURE — 84439 ASSAY OF FREE THYROXINE: CPT

## 2022-05-18 PROCEDURE — 80053 COMPREHEN METABOLIC PANEL: CPT

## 2022-05-18 PROCEDURE — 84443 ASSAY THYROID STIM HORMONE: CPT

## 2022-05-18 PROCEDURE — 82306 VITAMIN D 25 HYDROXY: CPT

## 2022-05-18 PROCEDURE — 83036 HEMOGLOBIN GLYCOSYLATED A1C: CPT

## 2022-05-18 PROCEDURE — 80061 LIPID PANEL: CPT

## 2022-05-18 PROCEDURE — 87389 HIV-1 AG W/HIV-1&-2 AB AG IA: CPT

## 2022-05-18 RX ORDER — CETIRIZINE HYDROCHLORIDE, PSEUDOEPHEDRINE HYDROCHLORIDE 5; 120 MG/1; MG/1
1 TABLET, FILM COATED, EXTENDED RELEASE ORAL EVERY 12 HOURS PRN
Qty: 28 TABLET | Refills: 0 | Status: SHIPPED | OUTPATIENT
Start: 2022-05-18 | End: 2022-05-24

## 2022-05-19 RX ORDER — MONTELUKAST SODIUM 10 MG/1
TABLET ORAL
Qty: 90 TABLET | Refills: 3 | Status: SHIPPED | OUTPATIENT
Start: 2022-05-19 | End: 2023-09-26 | Stop reason: SDUPTHER

## 2022-05-24 ENCOUNTER — OFFICE VISIT (OUTPATIENT)
Dept: FAMILY MEDICINE | Facility: CLINIC | Age: 52
End: 2022-05-24
Payer: MEDICAID

## 2022-05-24 VITALS
OXYGEN SATURATION: 98 % | WEIGHT: 192 LBS | HEIGHT: 62 IN | BODY MASS INDEX: 35.33 KG/M2 | RESPIRATION RATE: 20 BRPM | HEART RATE: 70 BPM | DIASTOLIC BLOOD PRESSURE: 67 MMHG | SYSTOLIC BLOOD PRESSURE: 114 MMHG

## 2022-05-24 DIAGNOSIS — E66.9 OBESITY, UNSPECIFIED CLASSIFICATION, UNSPECIFIED OBESITY TYPE, UNSPECIFIED WHETHER SERIOUS COMORBIDITY PRESENT: ICD-10-CM

## 2022-05-24 DIAGNOSIS — J32.9 SINUSITIS, UNSPECIFIED CHRONICITY, UNSPECIFIED LOCATION: ICD-10-CM

## 2022-05-24 DIAGNOSIS — F51.01 PRIMARY INSOMNIA: ICD-10-CM

## 2022-05-24 DIAGNOSIS — E55.9 VITAMIN D DEFICIENCY: Primary | ICD-10-CM

## 2022-05-24 LAB — HIV 1+2 AB+HIV1 P24 AG SERPL QL IA: NEGATIVE

## 2022-05-24 PROCEDURE — 1160F RVW MEDS BY RX/DR IN RCRD: CPT | Mod: CPTII,,, | Performed by: FAMILY MEDICINE

## 2022-05-24 PROCEDURE — 3074F SYST BP LT 130 MM HG: CPT | Mod: CPTII,,, | Performed by: FAMILY MEDICINE

## 2022-05-24 PROCEDURE — 3044F PR MOST RECENT HEMOGLOBIN A1C LEVEL <7.0%: ICD-10-PCS | Mod: CPTII,,, | Performed by: FAMILY MEDICINE

## 2022-05-24 PROCEDURE — 99214 OFFICE O/P EST MOD 30 MIN: CPT | Mod: S$PBB,,, | Performed by: FAMILY MEDICINE

## 2022-05-24 PROCEDURE — 1159F MED LIST DOCD IN RCRD: CPT | Mod: CPTII,,, | Performed by: FAMILY MEDICINE

## 2022-05-24 PROCEDURE — 3074F PR MOST RECENT SYSTOLIC BLOOD PRESSURE < 130 MM HG: ICD-10-PCS | Mod: CPTII,,, | Performed by: FAMILY MEDICINE

## 2022-05-24 PROCEDURE — 3008F BODY MASS INDEX DOCD: CPT | Mod: CPTII,,, | Performed by: FAMILY MEDICINE

## 2022-05-24 PROCEDURE — 99213 OFFICE O/P EST LOW 20 MIN: CPT | Mod: PBBFAC,PN,25 | Performed by: FAMILY MEDICINE

## 2022-05-24 PROCEDURE — 3078F PR MOST RECENT DIASTOLIC BLOOD PRESSURE < 80 MM HG: ICD-10-PCS | Mod: CPTII,,, | Performed by: FAMILY MEDICINE

## 2022-05-24 PROCEDURE — 3044F HG A1C LEVEL LT 7.0%: CPT | Mod: CPTII,,, | Performed by: FAMILY MEDICINE

## 2022-05-24 PROCEDURE — 3008F PR BODY MASS INDEX (BMI) DOCUMENTED: ICD-10-PCS | Mod: CPTII,,, | Performed by: FAMILY MEDICINE

## 2022-05-24 PROCEDURE — 99214 PR OFFICE/OUTPT VISIT, EST, LEVL IV, 30-39 MIN: ICD-10-PCS | Mod: S$PBB,,, | Performed by: FAMILY MEDICINE

## 2022-05-24 PROCEDURE — 1159F PR MEDICATION LIST DOCUMENTED IN MEDICAL RECORD: ICD-10-PCS | Mod: CPTII,,, | Performed by: FAMILY MEDICINE

## 2022-05-24 PROCEDURE — 99999 PR PBB SHADOW E&M-EST. PATIENT-LVL III: ICD-10-PCS | Mod: PBBFAC,,, | Performed by: FAMILY MEDICINE

## 2022-05-24 PROCEDURE — 99999 PR PBB SHADOW E&M-EST. PATIENT-LVL III: CPT | Mod: PBBFAC,,, | Performed by: FAMILY MEDICINE

## 2022-05-24 PROCEDURE — 1160F PR REVIEW ALL MEDS BY PRESCRIBER/CLIN PHARMACIST DOCUMENTED: ICD-10-PCS | Mod: CPTII,,, | Performed by: FAMILY MEDICINE

## 2022-05-24 PROCEDURE — 96372 THER/PROPH/DIAG INJ SC/IM: CPT | Mod: PBBFAC,PN

## 2022-05-24 PROCEDURE — 3078F DIAST BP <80 MM HG: CPT | Mod: CPTII,,, | Performed by: FAMILY MEDICINE

## 2022-05-24 RX ORDER — LEVOTHYROXINE SODIUM 100 UG/1
100 TABLET ORAL
Qty: 30 TABLET | Refills: 11 | Status: SHIPPED | OUTPATIENT
Start: 2022-05-24 | End: 2022-12-27 | Stop reason: SDUPTHER

## 2022-05-24 RX ORDER — TIZANIDINE 4 MG/1
4 TABLET ORAL EVERY 8 HOURS
Qty: 90 TABLET | Refills: 11 | Status: SHIPPED | OUTPATIENT
Start: 2022-05-24

## 2022-05-24 RX ORDER — ZOLPIDEM TARTRATE 10 MG/1
10 TABLET ORAL NIGHTLY
Qty: 30 TABLET | Refills: 5 | Status: SHIPPED | OUTPATIENT
Start: 2022-05-24 | End: 2022-12-23

## 2022-05-24 RX ORDER — ERGOCALCIFEROL 1.25 MG/1
CAPSULE ORAL
Qty: 24 CAPSULE | Refills: 0 | Status: SHIPPED | OUTPATIENT
Start: 2022-05-24 | End: 2022-05-24

## 2022-05-24 RX ORDER — PHENTERMINE HYDROCHLORIDE 37.5 MG/1
37.5 TABLET ORAL
Qty: 30 TABLET | Refills: 0 | Status: SHIPPED | OUTPATIENT
Start: 2022-05-24 | End: 2022-06-23

## 2022-05-24 RX ORDER — DEXAMETHASONE SODIUM PHOSPHATE 4 MG/ML
8 INJECTION, SOLUTION INTRA-ARTICULAR; INTRALESIONAL; INTRAMUSCULAR; INTRAVENOUS; SOFT TISSUE ONCE
Status: COMPLETED | OUTPATIENT
Start: 2022-05-24 | End: 2022-05-24

## 2022-05-24 RX ADMIN — DEXAMETHASONE SODIUM PHOSPHATE 8 MG: 4 INJECTION INTRA-ARTICULAR; INTRALESIONAL; INTRAMUSCULAR; INTRAVENOUS; SOFT TISSUE at 10:05

## 2022-05-24 NOTE — PROGRESS NOTES
" Patient ID: Jazmine Causey is a 51 y.o. female.    Chief Complaint: Follow-up (BW review) and Otalgia (L ear)      Reviewed family, medical, surgical, and social history.    No cp/sob  No change in mental status  No fever  No asymmetrical limb swelling    Objective:      /67 (BP Location: Left arm, Patient Position: Sitting, BP Method: Medium (Manual))   Pulse 70   Resp 20   Ht 5' 2" (1.575 m)   Wt 87.1 kg (192 lb)   SpO2 98%   BMI 35.12 kg/m²   RRR, CTAB, s/nt/nd, no c/c/e, non-toxic appearing, no focal weakness  Assessment:       1. Vitamin D deficiency    2. Primary insomnia    3. Obesity, unspecified classification, unspecified obesity type, unspecified whether serious comorbidity present    4. Sinusitis, unspecified chronicity, unspecified location        Plan:       Vitamin D deficiency  -     Discontinue: ergocalciferol (ERGOCALCIFEROL) 50,000 unit Cap; TAKE 1 CAPSULE BY MOUTH 2 TIMES A WEEK  Dispense: 24 capsule; Refill: 0    Primary insomnia  -     zolpidem (AMBIEN) 10 mg Tab; Take 1 tablet (10 mg total) by mouth every evening.  Dispense: 30 tablet; Refill: 5    Obesity, unspecified classification, unspecified obesity type, unspecified whether serious comorbidity present  -     phentermine (ADIPEX-P) 37.5 mg tablet; Take 1 tablet (37.5 mg total) by mouth before breakfast.  Dispense: 30 tablet; Refill: 0    Sinusitis, unspecified chronicity, unspecified location  -     dexamethasone injection 8 mg    Other orders  -     tiZANidine (ZANAFLEX) 4 MG tablet; Take 1 tablet (4 mg total) by mouth every 8 (eight) hours.  Dispense: 90 tablet; Refill: 11  -     levothyroxine (SYNTHROID) 100 MCG tablet; Take 1 tablet (100 mcg total) by mouth before breakfast.  Dispense: 30 tablet; Refill: 11            Reviewed, monitored, evaluated, and assessed chronic conditions as outlined above  Continue current medicines, any changes noted above  As shown  Labs, radiology studies, and procedures/notes from the " last 3 months were reviewed.    Risks, benefits, and side effects were discussed with the patient. All questions were answered to the fullest satisfaction of the patient, and pt verbalized understanding and agreement to treatment plan. Pt was to call with any new or worsening symptoms, or present to the ER.

## 2022-05-24 NOTE — PROGRESS NOTES
Orders in place for Decadron 8mg IM. Patient states name, date of birth and allergies reviewed.  Decadron 4mg/ml x2 vials = 8mg/2ml administered IM to right ventrogluteal muscle. NDC: 6436-2208-99 Lot: [id number] Exp: 11/30/2023. Patient tolerated without complaints. Instructed to remain in clinic for 15 minutes post injection. Verbalized understanding. No immediate reaction noted.

## 2022-06-04 LAB — NONINV COLON CA DNA+OCC BLD SCRN STL QL: NEGATIVE

## 2022-06-17 ENCOUNTER — OFFICE VISIT (OUTPATIENT)
Dept: FAMILY MEDICINE | Facility: CLINIC | Age: 52
End: 2022-06-17
Payer: MEDICAID

## 2022-06-17 VITALS
RESPIRATION RATE: 18 BRPM | BODY MASS INDEX: 35.26 KG/M2 | WEIGHT: 191.63 LBS | OXYGEN SATURATION: 99 % | HEIGHT: 62 IN | HEART RATE: 80 BPM | DIASTOLIC BLOOD PRESSURE: 94 MMHG | SYSTOLIC BLOOD PRESSURE: 158 MMHG

## 2022-06-17 DIAGNOSIS — J02.0 STREP PHARYNGITIS: Primary | ICD-10-CM

## 2022-06-17 DIAGNOSIS — R21 FACIAL RASH: ICD-10-CM

## 2022-06-17 PROCEDURE — 3008F PR BODY MASS INDEX (BMI) DOCUMENTED: ICD-10-PCS | Mod: CPTII,,,

## 2022-06-17 PROCEDURE — 1159F PR MEDICATION LIST DOCUMENTED IN MEDICAL RECORD: ICD-10-PCS | Mod: CPTII,,,

## 2022-06-17 PROCEDURE — 3008F BODY MASS INDEX DOCD: CPT | Mod: CPTII,,,

## 2022-06-17 PROCEDURE — 3044F PR MOST RECENT HEMOGLOBIN A1C LEVEL <7.0%: ICD-10-PCS | Mod: CPTII,,,

## 2022-06-17 PROCEDURE — 99999 PR PBB SHADOW E&M-EST. PATIENT-LVL IV: ICD-10-PCS | Mod: PBBFAC,,,

## 2022-06-17 PROCEDURE — 99214 PR OFFICE/OUTPT VISIT, EST, LEVL IV, 30-39 MIN: ICD-10-PCS | Mod: S$PBB,,,

## 2022-06-17 PROCEDURE — 3080F DIAST BP >= 90 MM HG: CPT | Mod: CPTII,,,

## 2022-06-17 PROCEDURE — 1159F MED LIST DOCD IN RCRD: CPT | Mod: CPTII,,,

## 2022-06-17 PROCEDURE — 99999 PR PBB SHADOW E&M-EST. PATIENT-LVL IV: CPT | Mod: PBBFAC,,,

## 2022-06-17 PROCEDURE — 3080F PR MOST RECENT DIASTOLIC BLOOD PRESSURE >= 90 MM HG: ICD-10-PCS | Mod: CPTII,,,

## 2022-06-17 PROCEDURE — 99214 OFFICE O/P EST MOD 30 MIN: CPT | Mod: S$PBB,,,

## 2022-06-17 PROCEDURE — 99214 OFFICE O/P EST MOD 30 MIN: CPT | Mod: PBBFAC,PN

## 2022-06-17 PROCEDURE — 3077F SYST BP >= 140 MM HG: CPT | Mod: CPTII,,,

## 2022-06-17 PROCEDURE — 3077F PR MOST RECENT SYSTOLIC BLOOD PRESSURE >= 140 MM HG: ICD-10-PCS | Mod: CPTII,,,

## 2022-06-17 PROCEDURE — 3044F HG A1C LEVEL LT 7.0%: CPT | Mod: CPTII,,,

## 2022-06-17 RX ORDER — AZITHROMYCIN 250 MG/1
TABLET, FILM COATED ORAL
Qty: 6 TABLET | Refills: 0 | Status: SHIPPED | OUTPATIENT
Start: 2022-06-17 | End: 2022-12-23

## 2022-06-17 NOTE — PROGRESS NOTES
Subjective:       Patient ID: Jazmine Causey is a 51 y.o. female.    Chief Complaint: Cough and Sore Throat (Face is broken out, feels sun burnt but is not, has a rash on her face. Today is the first day she has had a low grade fever. Started feeling bad Wednesday night )    Sore Throat   This is a new problem. The current episode started in the past 7 days. The problem has been unchanged. Neither side of throat is experiencing more pain than the other. Maximum temperature: just developed low grade today. The pain is mild. Pertinent negatives include no abdominal pain, congestion, coughing, diarrhea, drooling, ear pain, headaches, hoarse voice, plugged ear sensation, shortness of breath, swollen glands or vomiting. She has had exposure to strep. She has tried nothing for the symptoms.     Review of Systems   Constitutional: Positive for fatigue and fever. Negative for chills.   HENT: Positive for sore throat. Negative for congestion, drooling, ear pain and hoarse voice.    Respiratory: Negative for cough and shortness of breath.    Cardiovascular: Negative for chest pain and leg swelling.   Gastrointestinal: Negative for abdominal pain, constipation, diarrhea, nausea and vomiting.   Genitourinary: Negative for difficulty urinating.   Skin: Positive for rash.        Red painful rash on face. Has burning sensation. Requesting dermatology referral.    Neurological: Negative for dizziness and headaches.   All other systems reviewed and are negative.      Past Medical History:   Diagnosis Date    GERD (gastroesophageal reflux disease)     Hyperlipidemia     Thyroid disease      Past Surgical History:   Procedure Laterality Date    ENDOMETRIAL ABLATION      NECK SURGERY  09/12/2019    TUBAL LIGATION       Social History     Socioeconomic History    Marital status: Single   Tobacco Use    Smoking status: Former Smoker     Packs/day: 1.00     Types: Cigarettes     Start date: 11/18/2021    Smokeless tobacco:  "Never Used    Tobacco comment: Quit for 2 years, recently started back 6 months ago.   Substance and Sexual Activity    Alcohol use: No    Drug use: Never    Sexual activity: Not Currently     Partners: Male     Birth control/protection: See Surgical Hx     Family History   Problem Relation Age of Onset    Breast cancer Maternal Grandmother     Breast cancer Maternal Cousin     Hyperlipidemia Mother     Thyroid disease Mother     Cancer Mother     No Known Problems Father     Thyroid cancer Sister     Crohn's disease Sister     Hypertension Brother     No Known Problems Sister     Obesity Brother     Ovarian cancer Neg Hx        Objective:      BP (!) 158/94 (BP Location: Right arm, Patient Position: Sitting, BP Method: Medium (Automatic))   Pulse 80   Resp 18   Ht 5' 2" (1.575 m)   Wt 86.9 kg (191 lb 9.6 oz)   SpO2 99%   BMI 35.04 kg/m²   Physical Exam  Vitals and nursing note reviewed.   Constitutional:       Appearance: Normal appearance.   HENT:      Head: Normocephalic and atraumatic.      Nose: Nose normal.      Mouth/Throat:      Mouth: Mucous membranes are moist. No oral lesions.      Pharynx: Posterior oropharyngeal erythema present. No oropharyngeal exudate or uvula swelling.   Eyes:      Pupils: Pupils are equal, round, and reactive to light.   Cardiovascular:      Rate and Rhythm: Normal rate and regular rhythm.      Heart sounds: Normal heart sounds.   Pulmonary:      Effort: Pulmonary effort is normal.      Breath sounds: Normal breath sounds.   Abdominal:      General: Abdomen is flat.   Musculoskeletal:         General: Normal range of motion.      Cervical back: Normal range of motion.   Skin:     General: Skin is warm and dry.      Findings: Rash present. Rash is macular and papular.      Comments: Bilateral cheeks and across bridge of nose.    Neurological:      Mental Status: She is alert and oriented to person, place, and time.   Psychiatric:         Mood and Affect: Mood " normal.         Behavior: Behavior normal.         Thought Content: Thought content normal.         Judgment: Judgment normal.         Assessment:       1. Strep pharyngitis        Plan:       Strep pharyngitis  -     azithromycin (Z-JL) 250 MG tablet; Take two tablets on day 1, then 1 tablet on days 2-5.  Dispense: 6 tablet; Refill: 0          PLAN: Applies to all diagnosis and orders listed above.  - Start z-jl  - Derm referral placed  - Follow up if symptoms worsen or fail to improve.     Discussed with patient the plan of care. Medications reviewed. Medication side effects discussed. Patient has no questions or concerns at this time. Reviewed, monitored, evaluated, and assessed chronic conditions as outlined above. Informed patient to please notify me with any questions or concerns at anytime.    Thank you,  ROSALEE Milligan  Family Medicine  [facility]

## 2022-06-20 ENCOUNTER — TELEPHONE (OUTPATIENT)
Dept: FAMILY MEDICINE | Facility: CLINIC | Age: 52
End: 2022-06-20
Payer: MEDICAID

## 2022-06-20 NOTE — TELEPHONE ENCOUNTER
Sent referral to [facility] [phone](p) [phone](f).  06/20/22 Also called patient and gave her clinic number and address

## 2022-07-08 ENCOUNTER — PATIENT MESSAGE (OUTPATIENT)
Dept: FAMILY MEDICINE | Facility: CLINIC | Age: 52
End: 2022-07-08
Payer: MEDICAID

## 2022-10-07 ENCOUNTER — TELEPHONE (OUTPATIENT)
Dept: FAMILY MEDICINE | Facility: CLINIC | Age: 52
End: 2022-10-07
Payer: MEDICAID

## 2022-10-07 DIAGNOSIS — Z12.31 VISIT FOR SCREENING MAMMOGRAM: Primary | ICD-10-CM

## 2022-10-07 NOTE — TELEPHONE ENCOUNTER
----- Message from Shira Montalvo sent at 10/7/2022  3:43 PM CDT -----  Contact: Patient  Type:  Needs Medical Advice    Who Called:  Patient       Would the patient rather a call back or a response via [facility]? Call     Best Call Back Number: [phone] (home)      Additional Information:  Patient needs an ordered put in for a mammo.

## 2022-10-25 ENCOUNTER — HOSPITAL ENCOUNTER (OUTPATIENT)
Dept: RADIOLOGY | Facility: HOSPITAL | Age: 52
Discharge: HOME | End: 2022-10-25
Attending: FAMILY MEDICINE
Payer: MEDICAID

## 2022-10-25 DIAGNOSIS — Z12.31 VISIT FOR SCREENING MAMMOGRAM: ICD-10-CM

## 2022-10-25 PROCEDURE — 77063 BREAST TOMOSYNTHESIS BI: CPT | Mod: TC

## 2022-10-25 PROCEDURE — 77063 BREAST TOMOSYNTHESIS BI: CPT | Mod: 26,,, | Performed by: RADIOLOGY

## 2022-10-25 PROCEDURE — 77067 SCR MAMMO BI INCL CAD: CPT | Mod: 26,,, | Performed by: RADIOLOGY

## 2022-10-25 PROCEDURE — 77063 MAMMO DIGITAL SCREENING BILAT WITH TOMO: ICD-10-PCS | Mod: 26,,, | Performed by: RADIOLOGY

## 2022-10-25 PROCEDURE — 77067 MAMMO DIGITAL SCREENING BILAT WITH TOMO: ICD-10-PCS | Mod: 26,,, | Performed by: RADIOLOGY

## 2022-12-23 ENCOUNTER — LAB VISIT (OUTPATIENT)
Dept: LAB | Facility: HOSPITAL | Age: 52
End: 2022-12-23
Attending: FAMILY MEDICINE
Payer: MEDICAID

## 2022-12-23 ENCOUNTER — TELEPHONE (OUTPATIENT)
Dept: FAMILY MEDICINE | Facility: CLINIC | Age: 52
End: 2022-12-23

## 2022-12-23 ENCOUNTER — OFFICE VISIT (OUTPATIENT)
Dept: FAMILY MEDICINE | Facility: CLINIC | Age: 52
End: 2022-12-23
Payer: MEDICAID

## 2022-12-23 VITALS
HEART RATE: 75 BPM | HEIGHT: 62 IN | RESPIRATION RATE: 14 BRPM | SYSTOLIC BLOOD PRESSURE: 133 MMHG | OXYGEN SATURATION: 95 % | DIASTOLIC BLOOD PRESSURE: 72 MMHG | WEIGHT: 195.69 LBS | BODY MASS INDEX: 36.01 KG/M2

## 2022-12-23 DIAGNOSIS — E03.9 HYPOTHYROIDISM, UNSPECIFIED TYPE: ICD-10-CM

## 2022-12-23 DIAGNOSIS — G44.209 TENSION HEADACHE: ICD-10-CM

## 2022-12-23 DIAGNOSIS — E78.5 HYPERLIPIDEMIA, UNSPECIFIED HYPERLIPIDEMIA TYPE: ICD-10-CM

## 2022-12-23 DIAGNOSIS — M79.673 PAIN OF FOOT, UNSPECIFIED LATERALITY: Primary | ICD-10-CM

## 2022-12-23 LAB
ALBUMIN SERPL BCP-MCNC: 4.1 G/DL (ref 3.5–5.2)
ALP SERPL-CCNC: 56 U/L (ref 55–135)
ALT SERPL W/O P-5'-P-CCNC: 27 U/L (ref 10–44)
ANION GAP SERPL CALC-SCNC: 9 MMOL/L (ref 8–16)
AST SERPL-CCNC: 20 U/L (ref 10–40)
BASOPHILS # BLD AUTO: 0.09 K/UL (ref 0–0.2)
BASOPHILS NFR BLD: 1.3 % (ref 0–1.9)
BILIRUB SERPL-MCNC: 0.4 MG/DL (ref 0.1–1)
BUN SERPL-MCNC: 12 MG/DL (ref 6–20)
CALCIUM SERPL-MCNC: 9.7 MG/DL (ref 8.7–10.5)
CHLORIDE SERPL-SCNC: 106 MMOL/L (ref 95–110)
CHOLEST SERPL-MCNC: 219 MG/DL (ref 120–199)
CHOLEST/HDLC SERPL: 5.3 {RATIO} (ref 2–5)
CO2 SERPL-SCNC: 26 MMOL/L (ref 23–29)
CREAT SERPL-MCNC: 0.7 MG/DL (ref 0.5–1.4)
DIFFERENTIAL METHOD: NORMAL
EOSINOPHIL # BLD AUTO: 0.1 K/UL (ref 0–0.5)
EOSINOPHIL NFR BLD: 1.4 % (ref 0–8)
ERYTHROCYTE [DISTWIDTH] IN BLOOD BY AUTOMATED COUNT: 13 % (ref 11.5–14.5)
EST. GFR  (NO RACE VARIABLE): >60 ML/MIN/1.73 M^2
ESTIMATED AVG GLUCOSE: 111 MG/DL (ref 68–131)
GLUCOSE SERPL-MCNC: 95 MG/DL (ref 70–110)
HBA1C MFR BLD: 5.5 % (ref 4–5.6)
HCT VFR BLD AUTO: 41.2 % (ref 37–48.5)
HDLC SERPL-MCNC: 41 MG/DL (ref 40–75)
HDLC SERPL: 18.7 % (ref 20–50)
HGB BLD-MCNC: 13.8 G/DL (ref 12–16)
IMM GRANULOCYTES # BLD AUTO: 0.01 K/UL (ref 0–0.04)
IMM GRANULOCYTES NFR BLD AUTO: 0.1 % (ref 0–0.5)
LDLC SERPL CALC-MCNC: 154.4 MG/DL (ref 63–159)
LYMPHOCYTES # BLD AUTO: 3.1 K/UL (ref 1–4.8)
LYMPHOCYTES NFR BLD: 43.8 % (ref 18–48)
MCH RBC QN AUTO: 29.9 PG (ref 27–31)
MCHC RBC AUTO-ENTMCNC: 33.5 G/DL (ref 32–36)
MCV RBC AUTO: 89 FL (ref 82–98)
MONOCYTES # BLD AUTO: 0.4 K/UL (ref 0.3–1)
MONOCYTES NFR BLD: 5.8 % (ref 4–15)
NEUTROPHILS # BLD AUTO: 3.4 K/UL (ref 1.8–7.7)
NEUTROPHILS NFR BLD: 47.6 % (ref 38–73)
NONHDLC SERPL-MCNC: 178 MG/DL
NRBC BLD-RTO: 0 /100 WBC
PLATELET # BLD AUTO: 282 K/UL (ref 150–450)
PMV BLD AUTO: 9.6 FL (ref 9.2–12.9)
POTASSIUM SERPL-SCNC: 4.3 MMOL/L (ref 3.5–5.1)
PROT SERPL-MCNC: 7.2 G/DL (ref 6–8.4)
RBC # BLD AUTO: 4.62 M/UL (ref 4–5.4)
SODIUM SERPL-SCNC: 141 MMOL/L (ref 136–145)
T4 FREE SERPL-MCNC: 1.2 NG/DL (ref 0.71–1.51)
TRIGL SERPL-MCNC: 118 MG/DL (ref 30–150)
TSH SERPL DL<=0.005 MIU/L-ACNC: 0.39 UIU/ML (ref 0.4–4)
WBC # BLD AUTO: 7.04 K/UL (ref 3.9–12.7)

## 2022-12-23 PROCEDURE — 85025 COMPLETE CBC W/AUTO DIFF WBC: CPT | Performed by: FAMILY MEDICINE

## 2022-12-23 PROCEDURE — 3078F DIAST BP <80 MM HG: CPT | Mod: CPTII,,, | Performed by: FAMILY MEDICINE

## 2022-12-23 PROCEDURE — 84443 ASSAY THYROID STIM HORMONE: CPT | Performed by: FAMILY MEDICINE

## 2022-12-23 PROCEDURE — 3044F PR MOST RECENT HEMOGLOBIN A1C LEVEL <7.0%: ICD-10-PCS | Mod: CPTII,,, | Performed by: FAMILY MEDICINE

## 2022-12-23 PROCEDURE — 99999 PR PBB SHADOW E&M-EST. PATIENT-LVL III: CPT | Mod: PBBFAC,,, | Performed by: FAMILY MEDICINE

## 2022-12-23 PROCEDURE — 3044F HG A1C LEVEL LT 7.0%: CPT | Mod: CPTII,,, | Performed by: FAMILY MEDICINE

## 2022-12-23 PROCEDURE — 99999 PR PBB SHADOW E&M-EST. PATIENT-LVL III: ICD-10-PCS | Mod: PBBFAC,,, | Performed by: FAMILY MEDICINE

## 2022-12-23 PROCEDURE — 3075F PR MOST RECENT SYSTOLIC BLOOD PRESS GE 130-139MM HG: ICD-10-PCS | Mod: CPTII,,, | Performed by: FAMILY MEDICINE

## 2022-12-23 PROCEDURE — 99214 OFFICE O/P EST MOD 30 MIN: CPT | Mod: S$PBB,,, | Performed by: FAMILY MEDICINE

## 2022-12-23 PROCEDURE — 99213 OFFICE O/P EST LOW 20 MIN: CPT | Mod: PBBFAC,PN | Performed by: FAMILY MEDICINE

## 2022-12-23 PROCEDURE — 3075F SYST BP GE 130 - 139MM HG: CPT | Mod: CPTII,,, | Performed by: FAMILY MEDICINE

## 2022-12-23 PROCEDURE — 1159F MED LIST DOCD IN RCRD: CPT | Mod: CPTII,,, | Performed by: FAMILY MEDICINE

## 2022-12-23 PROCEDURE — 3008F PR BODY MASS INDEX (BMI) DOCUMENTED: ICD-10-PCS | Mod: CPTII,,, | Performed by: FAMILY MEDICINE

## 2022-12-23 PROCEDURE — 1160F RVW MEDS BY RX/DR IN RCRD: CPT | Mod: CPTII,,, | Performed by: FAMILY MEDICINE

## 2022-12-23 PROCEDURE — 1159F PR MEDICATION LIST DOCUMENTED IN MEDICAL RECORD: ICD-10-PCS | Mod: CPTII,,, | Performed by: FAMILY MEDICINE

## 2022-12-23 PROCEDURE — 99214 PR OFFICE/OUTPT VISIT, EST, LEVL IV, 30-39 MIN: ICD-10-PCS | Mod: S$PBB,,, | Performed by: FAMILY MEDICINE

## 2022-12-23 PROCEDURE — 1160F PR REVIEW ALL MEDS BY PRESCRIBER/CLIN PHARMACIST DOCUMENTED: ICD-10-PCS | Mod: CPTII,,, | Performed by: FAMILY MEDICINE

## 2022-12-23 PROCEDURE — 83036 HEMOGLOBIN GLYCOSYLATED A1C: CPT | Performed by: FAMILY MEDICINE

## 2022-12-23 PROCEDURE — 3078F PR MOST RECENT DIASTOLIC BLOOD PRESSURE < 80 MM HG: ICD-10-PCS | Mod: CPTII,,, | Performed by: FAMILY MEDICINE

## 2022-12-23 PROCEDURE — 36415 COLL VENOUS BLD VENIPUNCTURE: CPT | Performed by: FAMILY MEDICINE

## 2022-12-23 PROCEDURE — 84439 ASSAY OF FREE THYROXINE: CPT | Performed by: FAMILY MEDICINE

## 2022-12-23 PROCEDURE — 80061 LIPID PANEL: CPT | Performed by: FAMILY MEDICINE

## 2022-12-23 PROCEDURE — 3008F BODY MASS INDEX DOCD: CPT | Mod: CPTII,,, | Performed by: FAMILY MEDICINE

## 2022-12-23 PROCEDURE — 80053 COMPREHEN METABOLIC PANEL: CPT | Performed by: FAMILY MEDICINE

## 2022-12-23 RX ORDER — CELECOXIB 200 MG/1
200 CAPSULE ORAL 2 TIMES DAILY
Qty: 60 CAPSULE | Refills: 2 | Status: CANCELLED | OUTPATIENT
Start: 2022-12-23

## 2022-12-23 RX ORDER — CELECOXIB 200 MG/1
200 CAPSULE ORAL 2 TIMES DAILY
Qty: 60 CAPSULE | Refills: 2 | Status: SHIPPED | OUTPATIENT
Start: 2022-12-23

## 2022-12-23 NOTE — PROGRESS NOTES
" Patient ID: Jazmine Causey is a 52 y.o. female.    Chief Complaint: Follow-up and Foot Pain (L foot pain )      Reviewed family, medical, surgical, and social history.    No cp/sob  No change in mental status  No fever  No asymmetrical limb swelling    Objective:      /72 (BP Location: Right arm, Patient Position: Sitting, BP Method: Medium (Manual))   Pulse 75   Resp 14   Ht 5' 2" (1.575 m)   Wt 88.8 kg (195 lb 11.2 oz)   SpO2 95%   BMI 35.79 kg/m²   RRR, CTAB, s/nt/nd, no c/c/e, non-toxic appearing, no focal weakness  Assessment:       1. Pain of foot, unspecified laterality    2. Hyperlipidemia, unspecified hyperlipidemia type    3. Hypothyroidism, unspecified type    4. Tension headache          Plan:       Pain of foot, unspecified laterality  -     Ambulatory referral/consult to Podiatry; Future; Expected date: 12/30/2022    Hyperlipidemia, unspecified hyperlipidemia type  -     CBC Auto Differential; Future; Expected date: 12/23/2022  -     Comprehensive Metabolic Panel; Future; Expected date: 12/23/2022  -     Hemoglobin A1C; Future; Expected date: 12/23/2022  -     Lipid Panel; Future; Expected date: 12/23/2022  -     TSH; Future; Expected date: 12/23/2022  -     T4, Free; Future; Expected date: 12/23/2022    Hypothyroidism, unspecified type  -     CBC Auto Differential; Future; Expected date: 12/23/2022  -     Comprehensive Metabolic Panel; Future; Expected date: 12/23/2022  -     Hemoglobin A1C; Future; Expected date: 12/23/2022  -     Lipid Panel; Future; Expected date: 12/23/2022  -     TSH; Future; Expected date: 12/23/2022  -     T4, Free; Future; Expected date: 12/23/2022    Tension headache  -     celecoxib (CELEBREX) 200 MG capsule; Take 1 capsule (200 mg total) by mouth 2 (two) times daily.  Dispense: 60 capsule; Refill: 2              Continue current medicines, any changes noted above    Labs, radiology studies, and procedures/notes from the last 3 months were reviewed.    Risks, " benefits, and side effects were discussed with the patient. All questions were answered to the fullest satisfaction of the patient, and pt verbalized understanding and agreement to treatment plan. Pt was to call with any new or worsening symptoms, or present to the ER.

## 2022-12-23 NOTE — TELEPHONE ENCOUNTER
----- Message from Prema Nick sent at 12/23/2022 12:50 PM CST -----  Contact: pt  Type: Needs Medical Advice  Who Called:  pt     Pharmacy name and phone #:    [id number] - [place] - [address] [address] [facility] [address]  [address] [address] [address]  Phone: [phone] Fax: [phone]              Best Call Back Number: [phone]    Additional Information: pt states she needs  PA for celecoxib (CELEBREX) 200 MG capsule. Please advise.

## 2022-12-27 RX ORDER — LEVOTHYROXINE SODIUM 88 UG/1
88 TABLET ORAL
Qty: 90 TABLET | Refills: 3 | Status: SHIPPED | OUTPATIENT
Start: 2022-12-27

## 2022-12-31 ENCOUNTER — PATIENT MESSAGE (OUTPATIENT)
Dept: FAMILY MEDICINE | Facility: CLINIC | Age: 52
End: 2022-12-31
Payer: MEDICAID

## 2023-01-12 ENCOUNTER — HOSPITAL ENCOUNTER (OUTPATIENT)
Dept: RADIOLOGY | Facility: CLINIC | Age: 53
Discharge: HOME | End: 2023-01-12
Attending: PODIATRIST
Payer: MEDICAID

## 2023-01-12 ENCOUNTER — OFFICE VISIT (OUTPATIENT)
Dept: PODIATRY | Facility: CLINIC | Age: 53
End: 2023-01-12
Payer: MEDICAID

## 2023-01-12 VITALS — BODY MASS INDEX: 34.41 KG/M2 | HEIGHT: 62 IN | WEIGHT: 187 LBS

## 2023-01-12 DIAGNOSIS — M79.672 LEFT FOOT PAIN: ICD-10-CM

## 2023-01-12 DIAGNOSIS — M21.371 RIGHT FOOT DROP: ICD-10-CM

## 2023-01-12 DIAGNOSIS — M20.12 HALLUX VALGUS OF LEFT FOOT: Primary | ICD-10-CM

## 2023-01-12 DIAGNOSIS — R26.9 GAIT ABNORMALITY: ICD-10-CM

## 2023-01-12 DIAGNOSIS — M20.42 HAMMER TOE OF LEFT FOOT: ICD-10-CM

## 2023-01-12 DIAGNOSIS — S93.529A SPRAIN, METATARSOPHALANGEAL JOINT, INITIAL ENCOUNTER: ICD-10-CM

## 2023-01-12 DIAGNOSIS — M25.571 ACUTE RIGHT ANKLE PAIN: ICD-10-CM

## 2023-01-12 PROCEDURE — 3008F PR BODY MASS INDEX (BMI) DOCUMENTED: ICD-10-PCS | Mod: CPTII,S$GLB,, | Performed by: PODIATRIST

## 2023-01-12 PROCEDURE — 3008F BODY MASS INDEX DOCD: CPT | Mod: CPTII,S$GLB,, | Performed by: PODIATRIST

## 2023-01-12 PROCEDURE — 99204 PR OFFICE/OUTPT VISIT, NEW, LEVL IV, 45-59 MIN: ICD-10-PCS | Mod: S$GLB,,, | Performed by: PODIATRIST

## 2023-01-12 PROCEDURE — 73630 X-RAY EXAM OF FOOT: CPT | Mod: LT,S$GLB,, | Performed by: RADIOLOGY

## 2023-01-12 PROCEDURE — 73630 XR FOOT COMPLETE 3 VIEW LEFT: ICD-10-PCS | Mod: LT,S$GLB,, | Performed by: RADIOLOGY

## 2023-01-12 PROCEDURE — 1159F PR MEDICATION LIST DOCUMENTED IN MEDICAL RECORD: ICD-10-PCS | Mod: CPTII,S$GLB,, | Performed by: PODIATRIST

## 2023-01-12 PROCEDURE — 1160F PR REVIEW ALL MEDS BY PRESCRIBER/CLIN PHARMACIST DOCUMENTED: ICD-10-PCS | Mod: CPTII,S$GLB,, | Performed by: PODIATRIST

## 2023-01-12 PROCEDURE — 1160F RVW MEDS BY RX/DR IN RCRD: CPT | Mod: CPTII,S$GLB,, | Performed by: PODIATRIST

## 2023-01-12 PROCEDURE — 99204 OFFICE O/P NEW MOD 45 MIN: CPT | Mod: S$GLB,,, | Performed by: PODIATRIST

## 2023-01-12 PROCEDURE — 1159F MED LIST DOCD IN RCRD: CPT | Mod: CPTII,S$GLB,, | Performed by: PODIATRIST

## 2023-01-12 PROCEDURE — 73610 XR ANKLE COMPLETE 3 VIEW RIGHT: ICD-10-PCS | Mod: RT,S$GLB,, | Performed by: RADIOLOGY

## 2023-01-12 PROCEDURE — 73610 X-RAY EXAM OF ANKLE: CPT | Mod: RT,S$GLB,, | Performed by: RADIOLOGY

## 2023-01-12 NOTE — PROGRESS NOTES
"  [address] [facility] [address] [place] [address]  Phone: [phone]   [phone]    Patient's PCP:Francisco Javier Cloud MD  Referring Provider: Dr. Francisco Javier Cloud    Subjective:      Chief Complaint:: Foot Pain (Left dorsal foot pain down 4th toe), Ankle Problem (Right ankle gives out), and Foot Problem (Right drop foot)    HPI  Jazmine Causey is a 52 y.o. female who presents today with a complaint of pain in dorsal left foot down 4th toe lasting for about 2 weeks. Onset of symptoms unknown and reports no trauma.  Current symptoms include burning pains, popping.  Aggravating factors are weight-bearing. Symptoms have worsened. Also c/o right ankle issues. States ankle gives out on her randomly.  This started about 3 days ago. Patient stated she does have drop foot.  Treatment to date have included Ibuprofen 800mg, elevation.      Vitals:    01/12/23 1548   Weight: 84.8 kg (187 lb)   Height: 5' 2" (1.575 m)   PainSc:   7      Shoe Size: 8    Past Surgical History:   Procedure Laterality Date    ENDOMETRIAL ABLATION      NECK SURGERY  09/12/2019    TUBAL LIGATION       Past Medical History:   Diagnosis Date    GERD (gastroesophageal reflux disease)     Hyperlipidemia     Thyroid disease      Family History   Problem Relation Age of Onset    Breast cancer Maternal Grandmother     Breast cancer Maternal Cousin     Hyperlipidemia Mother     Thyroid disease Mother     Cancer Mother     No Known Problems Father     Thyroid cancer Sister     Crohn's disease Sister     Hypertension Brother     No Known Problems Sister     Obesity Brother     Ovarian cancer Neg Hx         Social History:   Marital Status: Single  Alcohol History:  reports no history of alcohol use.  Tobacco History:  reports that she has quit smoking. Her smoking use included cigarettes. She started smoking about 13 months ago. She smoked an average of 1 pack per day. She has never used smokeless tobacco.  Drug History:  reports no history of drug " use.    Review of patient's allergies indicates:   Allergen Reactions    Cephalexin        Current Outpatient Medications   Medication Sig Dispense Refill    celecoxib (CELEBREX) 200 MG capsule Take 1 capsule (200 mg total) by mouth 2 (two) times daily. 60 capsule 2    clotrimazole-betamethasone 1-0.05% (LOTRISONE) cream Apply topically 2 (two) times daily. 15 g 2    ergocalciferol (ERGOCALCIFEROL) 50,000 unit Cap TAKE 1 CAPSULE BY MOUTH 2 TIMES A WEEK 25 capsule 0    hydrOXYzine HCl (ATARAX) 25 MG tablet Take 1 tablet (25 mg total) by mouth 3 (three) times daily as needed (anxiety). 30 tablet 0    levothyroxine (SYNTHROID) 88 MCG tablet Take 1 tablet (88 mcg total) by mouth before breakfast. 90 tablet 3    montelukast (SINGULAIR) 10 mg tablet TAKE 1 TABLET(10 MG) BY MOUTH EVERY EVENING 90 tablet 3    polymyxin B sulf-trimethoprim (POLYTRIM) 10,000 unit- 1 mg/mL Drop Place 1 drop into the right eye every 6 (six) hours. 10 mL 1    tiZANidine (ZANAFLEX) 4 MG tablet Take 1 tablet (4 mg total) by mouth every 8 (eight) hours. 90 tablet 11    rosuvastatin (CRESTOR) 10 MG tablet Take 1 tablet (10 mg total) by mouth once daily. (Patient not taking: No sig reported) 90 tablet 3     No current facility-administered medications for this visit.       Review of Systems      Objective:        Physical Exam:   Foot Exam    General  General Appearance: appears stated age and healthy   Orientation: alert and oriented to person, place, and time   Affect: appropriate   Gait: antalgic       Right Foot/Ankle     Inspection and Palpation  Ecchymosis: none  Tenderness: lesser metatarsophalangeal joints and ankle   Swelling: lesser metatarsophalangeal joints and ankle   Arch: pes planus  Claw Toes: absent  Hallux valgus: no  Hallux limitus: no  Skin Exam: skin intact;   Neurovascular  Dorsalis pedis: 2+  Posterior tibial: 2+  Saphenous nerve sensation: normal  Tibial nerve sensation: normal  Superficial peroneal nerve sensation:  normal  Deep peroneal nerve sensation: normal  Sural nerve sensation: normal    Muscle Strength  Ankle dorsiflexion: 4-  Ankle plantar flexion: 5  Ankle inversion: 4  Ankle eversion: 4    Range of Motion    Passive  Ankle dorsiflexion: 0    Active  Ankle dorsiflexion: 0    Comments  Muscle weakness in dorsiflexion right foot and ankle with dorsiflexion to 0 °.    Left Foot/Ankle      Inspection and Palpation  Ecchymosis: none  Tenderness: great toe metatarsophalangeal joint and lesser metatarsophalangeal joints   Swelling: great toe metatarsophalangeal joint and lesser metatarsophalangeal joints   Arch: pes planus  Hammertoes: second toe, third toe and fourth toe (Dorsal medial contracture 2nd MPJ and medial contracture 3rd MPJ)  Claw toes: absent  Hallux valgus: yes  Hallux limitus: yes  Skin Exam: skin intact;   Neurovascular  Dorsalis pedis: 2+  Posterior tibial: 2+  Capillary refill: 2+  Saphenous nerve sensation: normal  Tibial nerve sensation: normal  Superficial peroneal nerve sensation: normal  Deep peroneal nerve sensation: normal  Sural nerve sensation: normal    Muscle Strength  Ankle dorsiflexion: 5  Ankle plantar flexion: 5  Ankle inversion: 5  Ankle eversion: 5  Great toe extension: 5  Great toe flexion: 5    Range of Motion    Normal left ankle ROM    Comments  Pain by bunion and hammertoes 2 and 3  Physical Exam  Cardiovascular:      Pulses:           Dorsalis pedis pulses are 2+ on the right side and 2+ on the left side.        Posterior tibial pulses are 2+ on the right side and 2+ on the left side.   Musculoskeletal:      Right ankle: Swelling present. Tenderness present.      Right foot: No bunion.      Left foot: Bunion present.        Feet:              Right Ankle/Foot Exam     Swelling   The patient is swollen on the lesser metatarsophalangeal joints.    Tenderness   The patient is tender to palpation of the lesser metatarsophalangeal joints.    Comments:  Muscle weakness in dorsiflexion  right foot and ankle with dorsiflexion to 0 °.    Left Ankle/Foot Exam     Swelling   The patient is swollen on the great toe metatarsophalangeal joint and lesser metatarsophalangeal joints.    Tenderness   The patient is tender to palpation of the great toe metatarsophalangeal joint and lesser metatarsophalangeal joints.    Range of Motion   The patient has normal left ankle ROM.     Muscle Strength   The patient has normal left ankle strength.    Comments:  Pain by bunion and hammertoes 2 and 3      Muscle Strength   Right Lower Extremity   Ankle Dorsiflexion:  4-   Plantar flexion:  5/5  Left Lower Extremity   Ankle Dorsiflexion:  5   Plantar flexion:  5/5     Vascular Exam     Right Pulses  Dorsalis Pedis:      2+  Posterior Tibial:      2+        Left Pulses  Dorsalis Pedis:      2+  Posterior Tibial:      2+         Imaging:   AP, lateral, lateral oblique weight-bearing x-rays right ankle:  No acute fractures, no bone tumors, no soft tissue masses seen.  No displacement of joint.  Small anterior tibial exostosis.  Small inferior posterior calcaneal exostosis.      AP, lateral, lateral oblique weight-bearing x-rays left foot:  No acute fractures, no bone tumors, no soft tissue masses seen.  Hallux valgus deformity moderate in deformity severely, hammertoe deformity 234 and 5, tailor's bunion present, subluxating 2nd 3rd MPJs long 2nd and 3rd metatarsals.  Inferior posterior calcaneal exostosis present         Assessment:       1. Hallux valgus of left foot    2. Left foot pain    3. Right foot drop    4. Acute right ankle pain    5. Gait abnormality    6. Hammer toe of left foot    7. Sprain, metatarsophalangeal joint, initial encounter      Plan:   Hallux valgus of left foot    Left foot pain  -     Ambulatory referral/consult to Podiatry  -     X-Ray Foot Complete Left    Right foot drop    Acute right ankle pain  -     X-Ray Ankle Complete Right    Gait abnormality    Hammer toe of left foot    Sprain,  metatarsophalangeal joint, initial encounter    Today evaluated the patient had a long discussion with her about multiple pathological findings:  1. She has weakness of the extensor muscles of the right lower extremity probably secondary to her back problems that she is had this cause some nerve damage.  She can only dorsiflexed the foot to barely neutral which throws lot of forefoot stress on the foot as well causes some pain posterior leg.  I discussed with the conservative care consisting of course physical therapy which I believe she is had already, and AFO to help her maintain the foot in neutral position while she is walking so she does not trip and also discussed with the possibility of an Achilles tendon lengthening or gastrocnemius recession.  Also recommend she consider revisiting Neurology have a nerve conduction velocity test electromyographic studies performed of the right lower extremity to see the severely of the damage.  I do not think at this time she needs an ankle fusion at this time she is more interested in having surgery on the left foot for her bunion hammertoes.    2. Evaluate her left foot she has a moderate bunion deformity with contractures at the 2nd and 3rd MPJs and hammertoe deformities 234 and 5.  The 2nd 3rd toes her more than the other toes bunion bothers her.  Today we have a discussion on the treatment of bunions and hammertoes with conservative care of course being shoe modification versus surgery.  She is interested in having surgery done on the foot.    Patient was educated about the entire pre, esther and post-operative time period.  They  were educated about the pro's and con's of surgery.  All conservative measures have been exhausted. Patient understands the particular risks involved which may occur in connection with the procedure proposed including pain, swelling, infection, stiffness, decreased ROM, recurrence, rejection, numbness, delayed healing, scar  formation.    Patient was educated that their diagnosis may be surgically treated.  The patient's problem will probably advance and usually will not get better without surgery.  All of the pre-operative treatment plans have been exhausted or will no longer be successful at this point in time.  Patient was told of the possible outcomes and expectations of the surgical procedure.  They  will need to be followed-up post-operatively.  Today, pictures were drawn, questions answered.      We discussed the following surgical procedures:  1. Bunionectomy with metatarsal osteotomy left foot 2. Shortening osteotomy 2nd and possible 3rd metatarsals left foot 3. Arthrodesis PIPJ toes 2 and 3 left foot and possible repair plantar plate.  Patient would like to schedule she will arrange today to be done with my nurse get appropriate lab tests done clearance as needed.        Procedures          Counseling:     I provided patient education verbally regarding:   Patient diagnosis, treatment options, as well as alternatives, risks, and benefits.     I provided patient education regarding: Bunion deformity and causes. I discussed conservative treatment with shoe modification, pads, treating symptoms with OTC NSAIDs, pads between toes, inserts and pads over the bunion. I explained that conservative treatment is non-curative but may help with pain and slow down the progression of the deformity.      I discussed hammertoe deformity and conservative treatment of deep, wider shoes, padding of the sore areas, OTC NSAID, skin softners, palliative care.  I discussed surgical procedures of fusion of the PIPJ of the toes with wires or implants, the follow up and the possible complications.     I discuss the the different stages of plantar tear and possible deformity subluxation of joint, dislocation of joint, increasing pain and deformity.  I discuss the conservative treatment of splinting the toe in rectus, off loading the joint with inserts  but trying to avoid cortisone shot to the joint but occassionaly giving a shot of decadron with marcaine.  I discussed possibility of surgical repair if the toe becomes unstable and more deformed.     I discussed the types of ankle equinus and the possible causes.  I discussed stretching, bracing with ankle foot orthotic, shoe modification and possible surgical correction.   This note was created using Dragon voice recognition software that occasionally misinterpreted phrases or words.

## 2023-01-16 DIAGNOSIS — M20.42 HAMMER TOE OF LEFT FOOT: ICD-10-CM

## 2023-01-16 DIAGNOSIS — M20.12 HALLUX VALGUS, LEFT: ICD-10-CM

## 2023-01-16 DIAGNOSIS — M79.672 LEFT FOOT PAIN: ICD-10-CM

## 2023-01-16 DIAGNOSIS — M20.12 HALLUX VALGUS OF LEFT FOOT: Primary | ICD-10-CM

## 2023-01-23 ENCOUNTER — TELEPHONE (OUTPATIENT)
Dept: PODIATRY | Facility: CLINIC | Age: 53
End: 2023-01-23
Payer: MEDICAID

## 2023-01-23 NOTE — TELEPHONE ENCOUNTER
Called patient to give her the pre-op instruction and the voicemail was not set up. I also left surgery pre-op and post-op instructions on the my-chart hardeep

## 2023-01-24 ENCOUNTER — TELEPHONE (OUTPATIENT)
Dept: PODIATRY | Facility: CLINIC | Age: 53
End: 2023-01-24
Payer: MEDICAID

## 2023-01-24 NOTE — TELEPHONE ENCOUNTER
----- Message from Katie Garrison sent at 1/24/2023 10:48 AM CST -----  Regarding: CANCELLING SURGERY FOR 1/25  Pt called and cancelled surgery, reason being she has no one to bring her.    Katie OBREGON

## 2023-01-24 NOTE — TELEPHONE ENCOUNTER
Spoke to patient who stated she has no transportation to get to the hospital and canceled her surgery.

## 2023-02-23 ENCOUNTER — TELEPHONE (OUTPATIENT)
Dept: FAMILY MEDICINE | Facility: CLINIC | Age: 53
End: 2023-02-23
Payer: MEDICAID

## 2023-08-02 ENCOUNTER — PATIENT MESSAGE (OUTPATIENT)
Dept: FAMILY MEDICINE | Facility: CLINIC | Age: 53
End: 2023-08-02
Payer: MEDICAID

## 2023-08-10 ENCOUNTER — TELEPHONE (OUTPATIENT)
Dept: FAMILY MEDICINE | Facility: CLINIC | Age: 53
End: 2023-08-10
Payer: MEDICAID

## 2023-08-10 NOTE — TELEPHONE ENCOUNTER
----- Message from Shabnam Humphries sent at 8/10/2023 10:09 AM CDT -----  Contact: PT  Type:  Same Day Appointment Request    Caller is requesting a same day appointment.  Caller declined first available appointment listed below.    Name of Caller:PT   When is the first available appointment? N/A  Symptoms: SHARP PAIN IN HER LEFT  SIDE ABOVE HER HIP BONE. HURTS WHEN PT COUGHS   Best Call Back Number:[phone] (home)     Additional Information: THANK YOU

## 2023-09-26 ENCOUNTER — TELEPHONE (OUTPATIENT)
Dept: FAMILY MEDICINE | Facility: CLINIC | Age: 53
End: 2023-09-26
Payer: MEDICAID

## 2023-09-26 ENCOUNTER — OFFICE VISIT (OUTPATIENT)
Dept: FAMILY MEDICINE | Facility: CLINIC | Age: 53
End: 2023-09-26
Payer: MEDICAID

## 2023-09-26 VITALS
HEIGHT: 62 IN | OXYGEN SATURATION: 97 % | SYSTOLIC BLOOD PRESSURE: 134 MMHG | DIASTOLIC BLOOD PRESSURE: 80 MMHG | WEIGHT: 180.19 LBS | RESPIRATION RATE: 19 BRPM | HEART RATE: 70 BPM | BODY MASS INDEX: 33.16 KG/M2

## 2023-09-26 DIAGNOSIS — Z12.31 SCREENING MAMMOGRAM FOR BREAST CANCER: ICD-10-CM

## 2023-09-26 DIAGNOSIS — E55.9 VITAMIN D DEFICIENCY: ICD-10-CM

## 2023-09-26 DIAGNOSIS — H10.9 CONJUNCTIVITIS, UNSPECIFIED CONJUNCTIVITIS TYPE, UNSPECIFIED LATERALITY: ICD-10-CM

## 2023-09-26 DIAGNOSIS — J06.9 UPPER RESPIRATORY TRACT INFECTION, UNSPECIFIED TYPE: Primary | ICD-10-CM

## 2023-09-26 PROCEDURE — 99214 OFFICE O/P EST MOD 30 MIN: CPT | Mod: S$PBB,,, | Performed by: FAMILY MEDICINE

## 2023-09-26 PROCEDURE — 3008F BODY MASS INDEX DOCD: CPT | Mod: CPTII,,, | Performed by: FAMILY MEDICINE

## 2023-09-26 PROCEDURE — 3079F DIAST BP 80-89 MM HG: CPT | Mod: CPTII,,, | Performed by: FAMILY MEDICINE

## 2023-09-26 PROCEDURE — 3008F PR BODY MASS INDEX (BMI) DOCUMENTED: ICD-10-PCS | Mod: CPTII,,, | Performed by: FAMILY MEDICINE

## 2023-09-26 PROCEDURE — 99213 OFFICE O/P EST LOW 20 MIN: CPT | Mod: PBBFAC | Performed by: FAMILY MEDICINE

## 2023-09-26 PROCEDURE — 3075F PR MOST RECENT SYSTOLIC BLOOD PRESS GE 130-139MM HG: ICD-10-PCS | Mod: CPTII,,, | Performed by: FAMILY MEDICINE

## 2023-09-26 PROCEDURE — 99999 PR PBB SHADOW E&M-EST. PATIENT-LVL III: ICD-10-PCS | Mod: PBBFAC,,, | Performed by: FAMILY MEDICINE

## 2023-09-26 PROCEDURE — 99214 PR OFFICE/OUTPT VISIT, EST, LEVL IV, 30-39 MIN: ICD-10-PCS | Mod: S$PBB,,, | Performed by: FAMILY MEDICINE

## 2023-09-26 PROCEDURE — 3075F SYST BP GE 130 - 139MM HG: CPT | Mod: CPTII,,, | Performed by: FAMILY MEDICINE

## 2023-09-26 PROCEDURE — 99999 PR PBB SHADOW E&M-EST. PATIENT-LVL III: CPT | Mod: PBBFAC,,, | Performed by: FAMILY MEDICINE

## 2023-09-26 PROCEDURE — 3079F PR MOST RECENT DIASTOLIC BLOOD PRESSURE 80-89 MM HG: ICD-10-PCS | Mod: CPTII,,, | Performed by: FAMILY MEDICINE

## 2023-09-26 RX ORDER — AZITHROMYCIN 250 MG/1
TABLET, FILM COATED ORAL
Qty: 6 TABLET | Refills: 0 | Status: SHIPPED | OUTPATIENT
Start: 2023-09-26 | End: 2023-10-01

## 2023-09-26 RX ORDER — POLYMYXIN B SULFATE AND TRIMETHOPRIM 1; 10000 MG/ML; [USP'U]/ML
1 SOLUTION OPHTHALMIC EVERY 6 HOURS
Qty: 10 ML | Refills: 1 | Status: SHIPPED | OUTPATIENT
Start: 2023-09-26

## 2023-09-26 RX ORDER — ERGOCALCIFEROL 1.25 MG/1
50000 CAPSULE ORAL
Qty: 25 CAPSULE | Refills: 0 | Status: SHIPPED | OUTPATIENT
Start: 2023-09-28

## 2023-09-26 RX ORDER — MONTELUKAST SODIUM 10 MG/1
10 TABLET ORAL NIGHTLY
Qty: 90 TABLET | Refills: 3 | Status: SHIPPED | OUTPATIENT
Start: 2023-09-26

## 2023-09-26 NOTE — PROGRESS NOTES
[facility] - Clinic Note    Subjective      Ms. Causey is a 52 y.o. female who presents to clinic with complaints of a cough.     Reports a productive cough with green phlegm for the past week.   Daughter with similar symptoms  Associated with rhinorrhea, sore throat, and eye discharge.   No relief with OTC meds    PMH Jazmine has a past medical history of GERD (gastroesophageal reflux disease), Hyperlipidemia, and Thyroid disease.   PSXH Jazmine has a past surgical history that includes Endometrial ablation; Tubal ligation; and Neck surgery (09/12/2019).    Jazmine's family history includes Breast cancer in her maternal cousin and maternal grandmother; Cancer in her mother; Crohn's disease in her sister; Hyperlipidemia in her mother; Hypertension in her brother; No Known Problems in her father and sister; Obesity in her brother; Thyroid cancer in her sister; Thyroid disease in her mother.    Jazmine reports that she has quit smoking. Her smoking use included cigarettes. She started smoking about 23 months ago. She has a 1.9 pack-year smoking history. She has never used smokeless tobacco. She reports that she does not drink alcohol and does not use drugs.   ALG Jazmine is allergic to cephalexin.   YURI Lucero has a current medication list which includes the following prescription(s): hydroxyzine hcl, levothyroxine, tizanidine, celecoxib, clotrimazole-betamethasone 1-0.05%, ergocalciferol, montelukast, polymyxin b sulf-trimethoprim, and rosuvastatin.     Review of Systems   Constitutional:  Negative for activity change, appetite change, chills, fatigue and fever.   HENT:  Positive for congestion, postnasal drip, rhinorrhea and sore throat. Negative for ear discharge, ear pain, sinus pressure, sinus pain and sneezing.    Eyes:  Positive for discharge and itching. Negative for photophobia, pain, redness and visual disturbance.   Respiratory:  Positive for cough. Negative for shortness of breath.   "  Cardiovascular:  Negative for chest pain, palpitations and leg swelling.   Gastrointestinal:  Negative for abdominal pain, nausea and vomiting.   Skin:  Negative for wound.   Neurological:  Negative for dizziness and headaches.   Psychiatric/Behavioral:  Negative for confusion.      Objective     /80 (BP Location: Left arm, Patient Position: Sitting, BP Method: Medium (Manual))   Pulse 70   Resp 19   Ht 5' 2" (1.575 m)   Wt 81.7 kg (180 lb 3.2 oz)   SpO2 97%   BMI 32.96 kg/m²     Physical Exam   Constitutional: normal appearance. She appears well-developed, well-nourished and obese.  Non-toxic appearance. No distress. She does not appear ill.   HENT:   Head: Normocephalic and atraumatic.   Right Ear: Tympanic membrane, external ear and ear canal normal.   Left Ear: Tympanic membrane, external ear and ear canal normal.   Nose: Nasal congestion present.   Mouth/Throat: Mucous membranes are moist. No oropharyngeal exudate or posterior oropharyngeal erythema. Oropharynx is clear.   Eyes: Right eye exhibits no discharge. Left eye exhibits no discharge. No scleral icterus.   Cardiovascular: Normal rate, regular rhythm, normal heart sounds and normal pulses. Exam reveals no gallop and no friction rub.   No murmur heard.Pulmonary:      Effort: Pulmonary effort is normal. No respiratory distress.      Breath sounds: Normal breath sounds. No wheezing, rhonchi or rales.     Abdominal: Normal appearance.   Musculoskeletal:      Cervical back: Neck supple.   Lymphadenopathy:     She has no cervical adenopathy.   Neurological: She is alert.   Skin: Skin is warm and dry. Capillary refill takes less than 2 seconds. She is not diaphoretic.   Psychiatric: Her behavior is normal. Mood, judgment and thought content normal.   Vitals reviewed.     Assessment/Plan     Jazmine was seen today for a URI.    Diagnoses and all orders for this visit:  -New patient and new problem to me    Upper respiratory tract infection, " unspecified type  -     azithromycin (Z-JL) 250 MG tablet; Take 2 tablets by mouth on day 1; Take 1 tablet by mouth on days 2-5  -     montelukast (SINGULAIR) 10 mg tablet; Take 1 tablet (10 mg total) by mouth every evening.    Vitamin D deficiency  -     ergocalciferol (ERGOCALCIFEROL) 50,000 unit Cap; Take 1 capsule (50,000 Units total) by mouth twice a week.    Conjunctivitis, unspecified conjunctivitis type, unspecified laterality  -     polymyxin B sulf-trimethoprim (POLYTRIM) 10,000 unit- 1 mg/mL Drop; Place 1 drop into the right eye every 6 (six) hours.    Screening mammogram for breast cancer  -     Mammo Digital Screening Bilat w/ [facility]; Future      Follow up if symptoms worsen or fail to improve.    Future Appointments   Date Time Provider Department Center   10/27/2023  8:20 AM [facility]   11/6/2023  8:30 AM Yazmin Crane DPM [facility]       Epi Demarco MD  Family Medicine  [facility]

## 2023-09-26 NOTE — TELEPHONE ENCOUNTER
----- Message from Vandana Erickson sent at 9/26/2023 10:06 AM CDT -----  Contact: pt [phone]  Type:  Same Day Appointment Request    Caller is requesting a same day appointment.  Caller declined first available appointment listed below.      Name of Caller:  Pt   When is the first available appointment?  Na   Symptoms:  Congestion   Best Call Back Number:  [phone]    Additional Information:   Pls call back and advise

## 2023-10-02 ENCOUNTER — OFFICE VISIT (OUTPATIENT)
Dept: PODIATRY | Facility: CLINIC | Age: 53
End: 2023-10-02
Payer: MEDICAID

## 2023-10-02 VITALS
HEART RATE: 75 BPM | WEIGHT: 181 LBS | SYSTOLIC BLOOD PRESSURE: 134 MMHG | DIASTOLIC BLOOD PRESSURE: 72 MMHG | HEIGHT: 62 IN | BODY MASS INDEX: 33.31 KG/M2

## 2023-10-02 DIAGNOSIS — M79.672 BILATERAL FOOT PAIN: ICD-10-CM

## 2023-10-02 DIAGNOSIS — M79.671 BILATERAL FOOT PAIN: ICD-10-CM

## 2023-10-02 DIAGNOSIS — G57.63 MORTON'S NEUROMA OF BOTH FEET: Primary | ICD-10-CM

## 2023-10-02 DIAGNOSIS — R26.2 DIFFICULTY WALKING: ICD-10-CM

## 2023-10-02 PROCEDURE — 3078F DIAST BP <80 MM HG: CPT | Mod: CPTII,S$GLB,, | Performed by: PODIATRIST

## 2023-10-02 PROCEDURE — 3075F SYST BP GE 130 - 139MM HG: CPT | Mod: CPTII,S$GLB,, | Performed by: PODIATRIST

## 2023-10-02 PROCEDURE — 64455 NJX AA&/STRD PLTR COM DG NRV: CPT | Mod: 50,S$GLB,, | Performed by: PODIATRIST

## 2023-10-02 PROCEDURE — 1160F RVW MEDS BY RX/DR IN RCRD: CPT | Mod: CPTII,S$GLB,, | Performed by: PODIATRIST

## 2023-10-02 PROCEDURE — 3075F PR MOST RECENT SYSTOLIC BLOOD PRESS GE 130-139MM HG: ICD-10-PCS | Mod: CPTII,S$GLB,, | Performed by: PODIATRIST

## 2023-10-02 PROCEDURE — 64455 NEUROMA INJECTION: ICD-10-PCS | Mod: 50,S$GLB,, | Performed by: PODIATRIST

## 2023-10-02 PROCEDURE — 1159F MED LIST DOCD IN RCRD: CPT | Mod: CPTII,S$GLB,, | Performed by: PODIATRIST

## 2023-10-02 PROCEDURE — 1160F PR REVIEW ALL MEDS BY PRESCRIBER/CLIN PHARMACIST DOCUMENTED: ICD-10-PCS | Mod: CPTII,S$GLB,, | Performed by: PODIATRIST

## 2023-10-02 PROCEDURE — 3008F PR BODY MASS INDEX (BMI) DOCUMENTED: ICD-10-PCS | Mod: CPTII,S$GLB,, | Performed by: PODIATRIST

## 2023-10-02 PROCEDURE — 99213 OFFICE O/P EST LOW 20 MIN: CPT | Mod: 25,S$GLB,, | Performed by: PODIATRIST

## 2023-10-02 PROCEDURE — 3078F PR MOST RECENT DIASTOLIC BLOOD PRESSURE < 80 MM HG: ICD-10-PCS | Mod: CPTII,S$GLB,, | Performed by: PODIATRIST

## 2023-10-02 PROCEDURE — 99213 PR OFFICE/OUTPT VISIT, EST, LEVL III, 20-29 MIN: ICD-10-PCS | Mod: 25,S$GLB,, | Performed by: PODIATRIST

## 2023-10-02 PROCEDURE — 3008F BODY MASS INDEX DOCD: CPT | Mod: CPTII,S$GLB,, | Performed by: PODIATRIST

## 2023-10-02 PROCEDURE — 1159F PR MEDICATION LIST DOCUMENTED IN MEDICAL RECORD: ICD-10-PCS | Mod: CPTII,S$GLB,, | Performed by: PODIATRIST

## 2023-10-02 RX ADMIN — LIDOCAINE HYDROCHLORIDE 1 ML: 10 INJECTION INFILTRATION; PERINEURAL at 04:10

## 2023-10-02 RX ADMIN — DEXAMETHASONE SODIUM PHOSPHATE 4 MG: 4 INJECTION, SOLUTION INTRA-ARTICULAR; INTRALESIONAL; INTRAMUSCULAR; INTRAVENOUS; SOFT TISSUE at 04:10

## 2023-10-03 ENCOUNTER — PATIENT MESSAGE (OUTPATIENT)
Dept: ADMINISTRATIVE | Facility: HOSPITAL | Age: 53
End: 2023-10-03
Payer: MEDICAID

## 2023-10-06 ENCOUNTER — TELEPHONE (OUTPATIENT)
Dept: FAMILY MEDICINE | Facility: CLINIC | Age: 53
End: 2023-10-06
Payer: MEDICAID

## 2023-10-06 NOTE — TELEPHONE ENCOUNTER
----- Message from Myla Rae sent at 10/6/2023 12:20 PM CDT -----  Contact: PT  Type:  Sooner Appointment Request    Caller is requesting a sooner appointment.  Caller declined first available appointment listed below.  Caller will not accept being placed on the waitlist and is requesting a message be sent to doctor.    Name of Caller:  PT  When is the first available appointment?  No solutions found  Symptoms:  Sinus Infection  Best Call Back Number:  [phone]  Additional Information:  PT Asking to be seen  on 10/9/23 around 4 or 4:30 daughter MRN  [id number] has appt on same date for 4:20

## 2023-10-09 ENCOUNTER — OFFICE VISIT (OUTPATIENT)
Dept: FAMILY MEDICINE | Facility: CLINIC | Age: 53
End: 2023-10-09
Payer: MEDICAID

## 2023-10-09 ENCOUNTER — TELEPHONE (OUTPATIENT)
Dept: FAMILY MEDICINE | Facility: CLINIC | Age: 53
End: 2023-10-09
Payer: MEDICAID

## 2023-10-09 VITALS
RESPIRATION RATE: 20 BRPM | OXYGEN SATURATION: 95 % | HEART RATE: 94 BPM | BODY MASS INDEX: 33.27 KG/M2 | HEIGHT: 62 IN | DIASTOLIC BLOOD PRESSURE: 66 MMHG | SYSTOLIC BLOOD PRESSURE: 106 MMHG | WEIGHT: 180.81 LBS

## 2023-10-09 DIAGNOSIS — J20.9 ACUTE BRONCHITIS, UNSPECIFIED ORGANISM: Primary | ICD-10-CM

## 2023-10-09 PROCEDURE — 99213 OFFICE O/P EST LOW 20 MIN: CPT | Mod: PBBFAC | Performed by: FAMILY MEDICINE

## 2023-10-09 PROCEDURE — 99214 OFFICE O/P EST MOD 30 MIN: CPT | Mod: S$PBB,,, | Performed by: FAMILY MEDICINE

## 2023-10-09 PROCEDURE — 3008F BODY MASS INDEX DOCD: CPT | Mod: CPTII,,, | Performed by: FAMILY MEDICINE

## 2023-10-09 PROCEDURE — 3078F DIAST BP <80 MM HG: CPT | Mod: CPTII,,, | Performed by: FAMILY MEDICINE

## 2023-10-09 PROCEDURE — 3078F PR MOST RECENT DIASTOLIC BLOOD PRESSURE < 80 MM HG: ICD-10-PCS | Mod: CPTII,,, | Performed by: FAMILY MEDICINE

## 2023-10-09 PROCEDURE — 1159F MED LIST DOCD IN RCRD: CPT | Mod: CPTII,,, | Performed by: FAMILY MEDICINE

## 2023-10-09 PROCEDURE — 3074F PR MOST RECENT SYSTOLIC BLOOD PRESSURE < 130 MM HG: ICD-10-PCS | Mod: CPTII,,, | Performed by: FAMILY MEDICINE

## 2023-10-09 PROCEDURE — 3008F PR BODY MASS INDEX (BMI) DOCUMENTED: ICD-10-PCS | Mod: CPTII,,, | Performed by: FAMILY MEDICINE

## 2023-10-09 PROCEDURE — 99999 PR PBB SHADOW E&M-EST. PATIENT-LVL III: CPT | Mod: PBBFAC,,, | Performed by: FAMILY MEDICINE

## 2023-10-09 PROCEDURE — 3074F SYST BP LT 130 MM HG: CPT | Mod: CPTII,,, | Performed by: FAMILY MEDICINE

## 2023-10-09 PROCEDURE — 99999 PR PBB SHADOW E&M-EST. PATIENT-LVL III: ICD-10-PCS | Mod: PBBFAC,,, | Performed by: FAMILY MEDICINE

## 2023-10-09 PROCEDURE — 99214 PR OFFICE/OUTPT VISIT, EST, LEVL IV, 30-39 MIN: ICD-10-PCS | Mod: S$PBB,,, | Performed by: FAMILY MEDICINE

## 2023-10-09 PROCEDURE — 1159F PR MEDICATION LIST DOCUMENTED IN MEDICAL RECORD: ICD-10-PCS | Mod: CPTII,,, | Performed by: FAMILY MEDICINE

## 2023-10-09 RX ORDER — PREDNISONE 20 MG/1
40 TABLET ORAL DAILY
Qty: 10 TABLET | Refills: 0 | Status: SHIPPED | OUTPATIENT
Start: 2023-10-09 | End: 2023-10-14

## 2023-10-09 RX ORDER — DOXYCYCLINE HYCLATE 100 MG
100 TABLET ORAL 2 TIMES DAILY
Qty: 20 TABLET | Refills: 0 | Status: SHIPPED | OUTPATIENT
Start: 2023-10-09 | End: 2023-10-19

## 2023-10-09 NOTE — TELEPHONE ENCOUNTER
----- Message from Shabnam Humphries sent at 10/9/2023  3:58 PM CDT -----  Contact: PT  PT RUNNING A FEW MINS LATE   [phone] (home)

## 2023-10-09 NOTE — PROGRESS NOTES
[facility] - Clinic Note    Subjective      Ms. Causey is a 52 y.o. female who presents to clinic with complaints of a cough.     Patient seen 2 weeks ago for a URI.   She was treated with a zpak.   Reports continued symptoms.   Productive cough with green sputum  Not taking anything over the counter.    PMH Jazmine has a past medical history of GERD (gastroesophageal reflux disease), Hyperlipidemia, and Thyroid disease.   PSXH Jazmine has a past surgical history that includes Endometrial ablation; Tubal ligation; and Neck surgery (09/12/2019).    Jazmine's family history includes Breast cancer in her maternal cousin and maternal grandmother; Cancer in her mother; Crohn's disease in her sister; Hyperlipidemia in her mother; Hypertension in her brother; No Known Problems in her father and sister; Obesity in her brother; Thyroid cancer in her sister; Thyroid disease in her mother.    Jazmine reports that she has quit smoking. Her smoking use included cigarettes. She started smoking about 22 months ago. She has a 1.9 pack-year smoking history. She has never used smokeless tobacco. She reports that she does not drink alcohol and does not use drugs.   ALG Jazmine is allergic to cephalexin.   MED Jazmine has a current medication list which includes the following prescription(s): celecoxib, clotrimazole-betamethasone 1-0.05%, ergocalciferol, hydroxyzine hcl, levothyroxine, montelukast, polymyxin b sulf-trimethoprim, tizanidine, doxycycline, prednisone, and rosuvastatin.     Review of Systems   Constitutional:  Negative for activity change, appetite change, chills, fatigue and fever.   HENT:  Positive for congestion and sinus pressure. Negative for ear discharge, ear pain, postnasal drip, rhinorrhea and sore throat.    Eyes:  Negative for visual disturbance.   Respiratory:  Positive for cough. Negative for shortness of breath.    Cardiovascular:  Negative for chest pain, palpitations and leg swelling.  "  Gastrointestinal:  Negative for abdominal pain, nausea and vomiting.   Skin:  Negative for wound.   Neurological:  Negative for dizziness and headaches.   Psychiatric/Behavioral:  Negative for confusion.      Objective     /66 (BP Location: Left arm, Patient Position: Sitting, BP Method: Medium (Automatic))   Pulse 94   Resp 20   Ht 5' 2" (1.575 m)   Wt 82 kg (180 lb 12.8 oz)   SpO2 95%   BMI 33.07 kg/m²     Physical Exam   Constitutional: normal appearance. She appears well-developed, well-nourished and obese.  Non-toxic appearance. No distress. She does not appear ill.   HENT:   Head: Normocephalic and atraumatic.   Right Ear: Tympanic membrane, external ear and ear canal normal.   Left Ear: Tympanic membrane, external ear and ear canal normal.   Nose: Nose normal. No rhinorrhea or nasal congestion.   Mouth/Throat: Mucous membranes are moist. No oropharyngeal exudate or posterior oropharyngeal erythema. Oropharynx is clear.   Eyes: Right eye exhibits no discharge. Left eye exhibits no discharge.   Cardiovascular: Normal rate, regular rhythm, normal heart sounds and normal pulses. Exam reveals no gallop and no friction rub.   No murmur heard.Pulmonary:      Effort: Pulmonary effort is normal. No respiratory distress.      Breath sounds: Wheezing and rhonchi present. No rales.     Abdominal: Normal appearance.   Musculoskeletal:      Cervical back: Neck supple.   Lymphadenopathy:     She has no cervical adenopathy.   Neurological: She is alert.   Skin: Skin is warm and dry. Capillary refill takes less than 2 seconds. She is not diaphoretic.   Psychiatric: Her behavior is normal. Mood, judgment and thought content normal.   Vitals reviewed.     Assessment/Plan     Jazmine was seen today for bronchitis.    Diagnoses and all orders for this visit:    Acute bronchitis, unspecified organism  -     doxycycline (VIBRA-TABS) 100 MG tablet; Take 1 tablet (100 mg total) by mouth 2 (two) times daily. for 10 " days  -     predniSONE (DELTASONE) 20 MG tablet; Take 2 tablets (40 mg total) by mouth once daily. for 5 days            Future Appointments   Date Time Provider Department Center   10/16/2023  8:30 AM Yazmin Crane DPM [facility]   [facility]       Epi Demarco MD  Family Medicine  [facility]

## 2023-10-11 RX ORDER — DEXAMETHASONE SODIUM PHOSPHATE 4 MG/ML
4 INJECTION, SOLUTION INTRA-ARTICULAR; INTRALESIONAL; INTRAMUSCULAR; INTRAVENOUS; SOFT TISSUE
Status: DISCONTINUED | OUTPATIENT
Start: 2023-10-02 | End: 2023-10-11 | Stop reason: HOSPADM

## 2023-10-11 RX ORDER — LIDOCAINE HYDROCHLORIDE 10 MG/ML
1 INJECTION INFILTRATION; PERINEURAL
Status: DISCONTINUED | OUTPATIENT
Start: 2023-10-02 | End: 2023-10-11 | Stop reason: HOSPADM

## 2023-10-11 NOTE — PROGRESS NOTES
Subjective:     Patient ID: Jazmine Causey is a 52 y.o. female.    Chief Complaint: Foot Pain    Jazmine is a 52 y.o. female who presents to the podiatry clinic  with complaint of  bilateral foot pain. Onset of the symptoms was several years ago. Precipitating event:  progressive bunion and hammertoe changes . Current symptoms include: ability to bear weight, but with some pain, worsening symptoms after a period of activity, and sharp shooting pains in the ball of the foot going through the toes . Aggravating factors:  prolonged use and barefoot walking . Symptoms have waxed and waned but are worse overall. Patient has had prior foot problems. Evaluation to date: plain films: abnormal bunion and hammertoe deformity . Treatment to date: none. Patients rates pain 7/10 on pain scale.    Review of Systems   Constitutional: Negative for chills and fever.   Cardiovascular:  Negative for chest pain and leg swelling.   Respiratory:  Negative for cough and shortness of breath.    Gastrointestinal:  Negative for diarrhea, nausea and vomiting.   Neurological:  Positive for paresthesias.        Objective:     Physical Exam  Vitals reviewed.   Constitutional:       General: She is not in acute distress.     Appearance: Normal appearance. She is not ill-appearing.   HENT:      Head: Normocephalic.      Nose: Nose normal.   Pulmonary:      Effort: Pulmonary effort is normal. No respiratory distress.   Skin:     Capillary Refill: Capillary refill takes 2 to 3 seconds.   Neurological:      Mental Status: She is alert and oriented to person, place, and time.   Psychiatric:         Mood and Affect: Mood normal.         Behavior: Behavior normal.         Thought Content: Thought content normal.         Judgment: Judgment normal.         Dermatologic:  No open lesions noted bilateral foot, no rashes noted bilateral foot, no interdigital maceration noted bilateral foot   Neurologic:  Protective and light touch sensation intact  bilateral lower extremity, positive Errol sign with palpation bilateral 2nd interspace  Musculoskeletal:  5/5 muscle strength noted bilateral foot, ankle joint range of motion is reduced without pain, reduced range of motion at the MPJ level bilateral foot, multiple contracted digits noted bilateral foot/hammertoe bilateral foot, medial deviation of 1st metatarsal with prominent dorsal medial eminence bilateral 1st MPJ/bunion bilateral 1st MPJ   Vascular: DP and PT pulses palpable 2/4 bilateral foot, capillary fill time less than 3 seconds to distal aspect of the digits bilateral foot    Assessment:      Encounter Diagnoses   Name Primary?    Bhupinder's neuroma of both feet Yes    Bilateral foot pain     Difficulty walking      Plan:     Jazmine was seen today for foot pain.    Diagnoses and all orders for this visit:    Bhupinder's neuroma of both feet  -     Neuroma injection    Bilateral foot pain  -     Neuroma injection    Difficulty walking  -     Neuroma injection      I counseled the patient on her conditions, their implications and medical management.        1. Patient was examined and evaluated.    2. Discussed with patient etiology of neuroma versus neuritis symptoms.  Discussed conservative treatment options with the patient including possible benefits of a local corticosteroid injection or oral Medrol Dosepak.  Neuroma injection    Date/Time: 10/2/2023 4:00 PM    Performed by: Yazmin Crane DPM  Authorized by: Yazmin Crane DPM    Consent Done?:  Yes (Verbal)  Indications:  Pain  Location Left Foot:  Second Webspace  Location Right Foot:  Second Webspace  Needle size:  25 G  Approach:  Dorsal  Medications:  1 mL LIDOcaine HCL 10 mg/ml (1%) 10 mg/mL (1 %); 4 mg dexAMETHasone 4 mg/mL      3. Discussed with patient etiology of hammertoe and bunion deformities.  Discussed conservative treatment options with the patient.  Patient was advised to adjust shoe gear for better comfort and fit including  increased toe box height and width and selection shoe gear with soft stretchable uppers.  Briefly discussed with patient surgical intervention for bunion and hammertoe correction.  Patient was advised to consider possible injection therapy prior to surgical intervention.    4. Patient was advised to decrease the amount of barefoot walking and limit the use of slides flip-flops.  5. Patient was advised to adjunct with OTC analgesics/NSAIDs for pain relief  6. Patient will follow-up in 2 weeks or p.r.n. for complaints

## 2023-10-16 ENCOUNTER — OFFICE VISIT (OUTPATIENT)
Dept: PODIATRY | Facility: CLINIC | Age: 53
End: 2023-10-16
Payer: MEDICAID

## 2023-10-16 VITALS
WEIGHT: 180 LBS | HEART RATE: 77 BPM | SYSTOLIC BLOOD PRESSURE: 125 MMHG | HEIGHT: 62 IN | DIASTOLIC BLOOD PRESSURE: 72 MMHG | BODY MASS INDEX: 33.13 KG/M2

## 2023-10-16 DIAGNOSIS — G57.63 MORTON'S NEUROMA OF BOTH FEET: Primary | ICD-10-CM

## 2023-10-16 DIAGNOSIS — M79.671 BILATERAL FOOT PAIN: ICD-10-CM

## 2023-10-16 DIAGNOSIS — R26.2 DIFFICULTY WALKING: ICD-10-CM

## 2023-10-16 DIAGNOSIS — M79.672 BILATERAL FOOT PAIN: ICD-10-CM

## 2023-10-16 PROCEDURE — 64455 NJX AA&/STRD PLTR COM DG NRV: CPT | Mod: 50,S$GLB,, | Performed by: PODIATRIST

## 2023-10-16 PROCEDURE — 99499 NO LOS: ICD-10-PCS | Mod: S$GLB,,, | Performed by: PODIATRIST

## 2023-10-16 PROCEDURE — 99499 UNLISTED E&M SERVICE: CPT | Mod: S$GLB,,, | Performed by: PODIATRIST

## 2023-10-16 PROCEDURE — 64455 NEUROMA INJECTION: ICD-10-PCS | Mod: 50,S$GLB,, | Performed by: PODIATRIST

## 2023-10-16 RX ORDER — DEXAMETHASONE SODIUM PHOSPHATE 4 MG/ML
4 INJECTION, SOLUTION INTRA-ARTICULAR; INTRALESIONAL; INTRAMUSCULAR; INTRAVENOUS; SOFT TISSUE
Status: DISCONTINUED | OUTPATIENT
Start: 2023-10-16 | End: 2023-10-16 | Stop reason: HOSPADM

## 2023-10-16 RX ORDER — LIDOCAINE HYDROCHLORIDE 10 MG/ML
1 INJECTION INFILTRATION; PERINEURAL
Status: DISCONTINUED | OUTPATIENT
Start: 2023-10-16 | End: 2023-10-16 | Stop reason: HOSPADM

## 2023-10-16 RX ADMIN — DEXAMETHASONE SODIUM PHOSPHATE 4 MG: 4 INJECTION, SOLUTION INTRA-ARTICULAR; INTRALESIONAL; INTRAMUSCULAR; INTRAVENOUS; SOFT TISSUE at 08:10

## 2023-10-16 RX ADMIN — LIDOCAINE HYDROCHLORIDE 1 ML: 10 INJECTION INFILTRATION; PERINEURAL at 08:10

## 2023-10-16 NOTE — PROGRESS NOTES
Subjective:     Patient ID: Jazmine Causey is a 52 y.o. female.    Chief Complaint: Foot Pain    Jazmine is a 52 y.o. female who presents to the podiatry clinic  with complaint of  bilateral foot pain. Onset of the symptoms was several years ago. Precipitating event:  progressive bunion and hammertoe changes . Current symptoms include: ability to bear weight, but with some pain, worsening symptoms after a period of activity, and sharp shooting pains in the ball of the foot going through the toes . Aggravating factors:  prolonged use and barefoot walking . Symptoms have waxed and waned but are worse overall. Patient has had prior foot problems. Evaluation to date: plain films: abnormal bunion and hammertoe deformity . Treatment to date: local corticosteroid injection which was effective. Patients rates pain 4/10 on pain scale.    Review of Systems   Constitutional: Negative for chills and fever.   Cardiovascular:  Negative for chest pain and leg swelling.   Respiratory:  Negative for cough and shortness of breath.    Gastrointestinal:  Negative for diarrhea, nausea and vomiting.   Neurological:  Positive for paresthesias.        Objective:     Physical Exam  Vitals reviewed.   Constitutional:       General: She is not in acute distress.     Appearance: Normal appearance. She is not ill-appearing.   HENT:      Head: Normocephalic.      Nose: Nose normal.   Cardiovascular:      Rate and Rhythm: Normal rate.   Pulmonary:      Effort: Pulmonary effort is normal. No respiratory distress.   Skin:     Capillary Refill: Capillary refill takes 2 to 3 seconds.   Neurological:      Mental Status: She is alert and oriented to person, place, and time.   Psychiatric:         Mood and Affect: Mood normal.         Behavior: Behavior normal.         Thought Content: Thought content normal.         Judgment: Judgment normal.            Dermatologic:  No open lesions noted bilateral foot, no rashes noted bilateral foot, no  interdigital maceration noted bilateral foot   Neurologic:  Protective and light touch sensation intact bilateral lower extremity, positive Errol sign with palpation bilateral 2nd interspace  Musculoskeletal:  5/5 muscle strength noted bilateral foot, ankle joint range of motion is reduced without pain, reduced range of motion at the MPJ level bilateral foot, multiple contracted digits noted bilateral foot/hammertoe bilateral foot, medial deviation of 1st metatarsal with prominent dorsal medial eminence bilateral 1st MPJ/bunion bilateral 1st MPJ   Vascular: DP and PT pulses palpable 2/4 bilateral foot, capillary fill time less than 3 seconds to distal aspect of the digits bilateral foot    Assessment:      Encounter Diagnoses   Name Primary?    Roe's neuroma of both feet Yes    Bilateral foot pain     Difficulty walking      Plan:     Jazmine was seen today for foot pain.    Diagnoses and all orders for this visit:    Roe's neuroma of both feet  -     Neuroma injection    Bilateral foot pain  -     Neuroma injection    Difficulty walking  -     Neuroma injection      I counseled the patient on her conditions, their implications and medical management.        1. Patient was examined and evaluated.    2. Again Discussed with patient etiology of neuroma versus neuritis symptoms.  Again Discussed conservative treatment options with the patient including possible benefits of a local corticosteroid injection or oral Medrol Dosepak.  Neuroma injection    Date/Time: 10/16/2023 8:30 AM    Performed by: Yazmin Crane DPM  Authorized by: Yazmin Crane DPM    Consent Done?:  Yes (Verbal)  Indications:  Pain  Location Left Foot:  Second Webspace  Location Right Foot:  Second Webspace  Needle size:  25 G  Approach:  Dorsal  Medications:  1 mL LIDOcaine HCL 10 mg/ml (1%) 10 mg/mL (1 %); 4 mg dexAMETHasone 4 mg/mL  Patient tolerance:  Patient tolerated the procedure well with no immediate complications       3.  Again Discussed with patient etiology of hammertoe and bunion deformities.  Discussed conservative treatment options with the patient.  Patient was advised to adjust shoe gear for better comfort and fit including increased toe box height and width and selection shoe gear with soft stretchable uppers.  Briefly discussed with patient surgical intervention for bunion and hammertoe correction.  Patient was advised to consider possible injection therapy prior to surgical intervention.    4. Patient was advised to decrease the amount of barefoot walking and limit the use of slides flip-flops.  5. Patient was advised to adjunct with OTC analgesics/NSAIDs for pain relief  6. Patient will follow-up in 3 weeks or p.r.n. for complaints

## 2023-10-17 ENCOUNTER — PATIENT MESSAGE (OUTPATIENT)
Dept: ADMINISTRATIVE | Facility: HOSPITAL | Age: 53
End: 2023-10-17
Payer: MEDICAID

## 2023-10-27 ENCOUNTER — HOSPITAL ENCOUNTER (OUTPATIENT)
Dept: RADIOLOGY | Facility: HOSPITAL | Age: 53
Discharge: HOME | End: 2023-10-27
Attending: FAMILY MEDICINE
Payer: MEDICAID

## 2023-10-27 DIAGNOSIS — Z12.31 SCREENING MAMMOGRAM FOR BREAST CANCER: ICD-10-CM

## 2023-10-27 PROCEDURE — 77063 BREAST TOMOSYNTHESIS BI: CPT | Mod: 26,,, | Performed by: RADIOLOGY

## 2023-10-27 PROCEDURE — 77067 SCR MAMMO BI INCL CAD: CPT | Mod: TC

## 2023-10-27 PROCEDURE — 77067 MAMMO DIGITAL SCREENING BILAT WITH TOMO: ICD-10-PCS | Mod: 26,,, | Performed by: RADIOLOGY

## 2023-10-27 PROCEDURE — 77067 SCR MAMMO BI INCL CAD: CPT | Mod: 26,,, | Performed by: RADIOLOGY

## 2023-10-27 PROCEDURE — 77063 MAMMO DIGITAL SCREENING BILAT WITH TOMO: ICD-10-PCS | Mod: 26,,, | Performed by: RADIOLOGY

## 2023-11-06 ENCOUNTER — OFFICE VISIT (OUTPATIENT)
Dept: PODIATRY | Facility: CLINIC | Age: 53
End: 2023-11-06
Payer: MEDICAID

## 2023-11-06 VITALS
HEIGHT: 62 IN | DIASTOLIC BLOOD PRESSURE: 69 MMHG | BODY MASS INDEX: 33.13 KG/M2 | SYSTOLIC BLOOD PRESSURE: 120 MMHG | WEIGHT: 180 LBS | HEART RATE: 69 BPM

## 2023-11-06 DIAGNOSIS — M79.672 LEFT FOOT PAIN: ICD-10-CM

## 2023-11-06 DIAGNOSIS — M20.42 HAMMER TOE OF LEFT FOOT: Primary | ICD-10-CM

## 2023-11-06 DIAGNOSIS — G57.92 NEURITIS OF LEFT FOOT: ICD-10-CM

## 2023-11-06 PROCEDURE — 1160F RVW MEDS BY RX/DR IN RCRD: CPT | Mod: CPTII,S$GLB,, | Performed by: PODIATRIST

## 2023-11-06 PROCEDURE — 3078F PR MOST RECENT DIASTOLIC BLOOD PRESSURE < 80 MM HG: ICD-10-PCS | Mod: CPTII,S$GLB,, | Performed by: PODIATRIST

## 2023-11-06 PROCEDURE — 3074F PR MOST RECENT SYSTOLIC BLOOD PRESSURE < 130 MM HG: ICD-10-PCS | Mod: CPTII,S$GLB,, | Performed by: PODIATRIST

## 2023-11-06 PROCEDURE — 1159F PR MEDICATION LIST DOCUMENTED IN MEDICAL RECORD: ICD-10-PCS | Mod: CPTII,S$GLB,, | Performed by: PODIATRIST

## 2023-11-06 PROCEDURE — 1160F PR REVIEW ALL MEDS BY PRESCRIBER/CLIN PHARMACIST DOCUMENTED: ICD-10-PCS | Mod: CPTII,S$GLB,, | Performed by: PODIATRIST

## 2023-11-06 PROCEDURE — 3078F DIAST BP <80 MM HG: CPT | Mod: CPTII,S$GLB,, | Performed by: PODIATRIST

## 2023-11-06 PROCEDURE — 3008F BODY MASS INDEX DOCD: CPT | Mod: CPTII,S$GLB,, | Performed by: PODIATRIST

## 2023-11-06 PROCEDURE — 99213 OFFICE O/P EST LOW 20 MIN: CPT | Mod: S$GLB,,, | Performed by: PODIATRIST

## 2023-11-06 PROCEDURE — 3008F PR BODY MASS INDEX (BMI) DOCUMENTED: ICD-10-PCS | Mod: CPTII,S$GLB,, | Performed by: PODIATRIST

## 2023-11-06 PROCEDURE — 3074F SYST BP LT 130 MM HG: CPT | Mod: CPTII,S$GLB,, | Performed by: PODIATRIST

## 2023-11-06 PROCEDURE — 99213 PR OFFICE/OUTPT VISIT, EST, LEVL III, 20-29 MIN: ICD-10-PCS | Mod: S$GLB,,, | Performed by: PODIATRIST

## 2023-11-06 PROCEDURE — 1159F MED LIST DOCD IN RCRD: CPT | Mod: CPTII,S$GLB,, | Performed by: PODIATRIST

## 2023-11-06 RX ORDER — ERYTHROMYCIN 5 MG/G
OINTMENT OPHTHALMIC NIGHTLY
COMMUNITY
Start: 2023-11-01

## 2023-11-06 NOTE — PROGRESS NOTES
Subjective:     Patient ID: Jazmine Causey is a 52 y.o. female.    Chief Complaint: Foot Pain    Jazmine is a 52 y.o. female who presents to the podiatry clinic  with complaint of  left foot pain. Onset of the symptoms was several weeks ago. Precipitating event: none known. Current symptoms include: ability to bear weight, but with some pain, worsening symptoms after a period of activity, and numbness, tingling and burning of left 4th toe . Aggravating factors:  prolonged use . Symptoms have waxed and waned. Patient has had prior foot problems.  Treatment to date: corticosteroid injection which was somewhat effective. Patients rates pain 5/10 on pain scale.    Review of Systems   Constitutional: Negative for chills and fever.   Cardiovascular:  Negative for chest pain and leg swelling.   Respiratory:  Negative for cough and shortness of breath.    Gastrointestinal:  Negative for diarrhea, nausea and vomiting.   Neurological:  Positive for numbness and paresthesias.        Objective:     Physical Exam  Vitals reviewed.   Constitutional:       General: She is not in acute distress.     Appearance: Normal appearance. She is not ill-appearing.   HENT:      Head: Normocephalic.      Nose: Nose normal.   Cardiovascular:      Rate and Rhythm: Normal rate.   Pulmonary:      Effort: Pulmonary effort is normal. No respiratory distress.   Skin:     Capillary Refill: Capillary refill takes 2 to 3 seconds.   Neurological:      Mental Status: She is alert and oriented to person, place, and time.   Psychiatric:         Mood and Affect: Mood normal.         Behavior: Behavior normal.         Thought Content: Thought content normal.         Judgment: Judgment normal.       Dermatologic:  No open lesions noted bilateral foot, no rashes noted bilateral foot, no interdigital maceration noted bilateral foot   Neurologic:  Protective and light touch sensation intact bilateral lower extremity, positive paresthesias reported to left  3rd and 4th interspace, numbness of lateral left 4th digit reported, [place] Ambrocio monofilament 5.07 negative at left 4th digit  Musculoskeletal:  5/5 muscle strength noted bilateral foot, ankle joint range of motion is reduced without pain, reduced range of motion at the MPJ level bilateral foot, multiple contracted digits noted bilateral foot/hammertoe bilateral foot (especially left 4th: adductovarus), medial deviation of 1st metatarsal with prominent dorsal medial eminence bilateral 1st MPJ/bunion bilateral 1st MPJ   Vascular: DP and PT pulses palpable 2/4 bilateral foot, capillary fill time less than 3 seconds to distal aspect of the digits bilateral foot    Assessment:      Encounter Diagnoses   Name Primary?    Hammer toe of left foot Yes    Left foot pain     Neuritis of left foot      Plan:     Jazmine was seen today for foot pain.    Diagnoses and all orders for this visit:    Hammer toe of left foot    Left foot pain    Neuritis of left foot      I counseled the patient on her conditions, their implications and medical management.      1. Patient was examined and evaluated.    2. Discussed with patient etiology of hammertoe deformity and presence of callus at the distal lateral aspect of the left 4th digit.  Patient made aware that this maybe the result of consistent pressure/ friction at that site and could cause nerve related complaints.  Discussed with patient surgical intervention for hammertoe correction.  Patient was advised to adjust shoe gear for better comfort fit including increased toe box height and width and selection shoe gear with soft stretchable uppers.  Again discussed with patient potential benefits of local corticosteroid injection or oral Medrol Dosepak should pain complaints worsen over time.  3. Discussed with patient neuritis of the left foot.  Discussed oral neuropathic pain medication such as Neurontin/, Lyrica, or Cymbalta.  Patient states the symptoms are only occasionally  at the point where she would take oral medications.  Patient was advised to adjunct with OTC NSAIDs and consider topical OTC nerve pain treatments.  4. Patient will follow-up in 3 weeks or p.r.n. for complaints

## 2023-11-27 ENCOUNTER — OFFICE VISIT (OUTPATIENT)
Dept: PODIATRY | Facility: CLINIC | Age: 53
End: 2023-11-27
Payer: MEDICAID

## 2023-11-27 VITALS — WEIGHT: 180 LBS | BODY MASS INDEX: 33.13 KG/M2 | HEIGHT: 62 IN

## 2023-11-27 DIAGNOSIS — M79.672 LEFT FOOT PAIN: ICD-10-CM

## 2023-11-27 DIAGNOSIS — M21.371 RIGHT FOOT DROP: ICD-10-CM

## 2023-11-27 DIAGNOSIS — R26.2 DIFFICULTY WALKING: ICD-10-CM

## 2023-11-27 DIAGNOSIS — B07.0 PLANTAR WART: Primary | ICD-10-CM

## 2023-11-27 PROCEDURE — 99213 PR OFFICE/OUTPT VISIT, EST, LEVL III, 20-29 MIN: ICD-10-PCS | Mod: 25,S$GLB,, | Performed by: PODIATRIST

## 2023-11-27 PROCEDURE — 1160F RVW MEDS BY RX/DR IN RCRD: CPT | Mod: CPTII,S$GLB,, | Performed by: PODIATRIST

## 2023-11-27 PROCEDURE — 3008F BODY MASS INDEX DOCD: CPT | Mod: CPTII,S$GLB,, | Performed by: PODIATRIST

## 2023-11-27 PROCEDURE — 1159F PR MEDICATION LIST DOCUMENTED IN MEDICAL RECORD: ICD-10-PCS | Mod: CPTII,S$GLB,, | Performed by: PODIATRIST

## 2023-11-27 PROCEDURE — 1160F PR REVIEW ALL MEDS BY PRESCRIBER/CLIN PHARMACIST DOCUMENTED: ICD-10-PCS | Mod: CPTII,S$GLB,, | Performed by: PODIATRIST

## 2023-11-27 PROCEDURE — 3008F PR BODY MASS INDEX (BMI) DOCUMENTED: ICD-10-PCS | Mod: CPTII,S$GLB,, | Performed by: PODIATRIST

## 2023-11-27 PROCEDURE — 17110 DESTRUCTION OF BENIGN LESION: ICD-10-PCS | Mod: S$GLB,,, | Performed by: PODIATRIST

## 2023-11-27 PROCEDURE — 1159F MED LIST DOCD IN RCRD: CPT | Mod: CPTII,S$GLB,, | Performed by: PODIATRIST

## 2023-11-27 PROCEDURE — 17110 DESTRUCTION B9 LES UP TO 14: CPT | Mod: S$GLB,,, | Performed by: PODIATRIST

## 2023-11-27 PROCEDURE — 99213 OFFICE O/P EST LOW 20 MIN: CPT | Mod: 25,S$GLB,, | Performed by: PODIATRIST

## 2023-11-27 NOTE — PROGRESS NOTES
Subjective:     Patient ID: Jazmine Causey is a 52 y.o. female.    Chief Complaint: Hammer Toe    Jazmine is a 52 y.o. female who presents to the podiatry clinic  with complaint of  left foot pain. Onset of the symptoms was a week ago. Precipitating event: increased activity and growth of paiful soft tissue lesion/ wart on the bottom of left foot . Current symptoms include: ability to bear weight, but with some pain and worsening symptoms after a period of activity. Aggravating factors: walking. Symptoms have gradually worsened. Patient has had prior foot problems. Treatment to date: none. Patients rates pain 5/10 on pain scale.    Review of Systems   Constitutional: Negative for chills and fever.   Cardiovascular:  Negative for chest pain and leg swelling.   Respiratory:  Negative for cough and shortness of breath.    Gastrointestinal:  Negative for diarrhea, nausea and vomiting.   Neurological:  Positive for focal weakness (right lower leg), numbness and paresthesias.        Objective:     Physical Exam  Vitals reviewed.   Constitutional:       General: She is not in acute distress.     Appearance: Normal appearance. She is not ill-appearing.   HENT:      Head: Normocephalic.      Nose: Nose normal.   Cardiovascular:      Rate and Rhythm: Normal rate.   Pulmonary:      Effort: Pulmonary effort is normal. No respiratory distress.   Skin:     Capillary Refill: Capillary refill takes 2 to 3 seconds.   Neurological:      Mental Status: She is alert and oriented to person, place, and time.   Psychiatric:         Mood and Affect: Mood normal.         Behavior: Behavior normal.         Thought Content: Thought content normal.         Judgment: Judgment normal.       Dermatologic:  No open lesions noted bilateral foot, no rashes noted bilateral foot, no interdigital maceration noted bilateral foot, hyperkeratotic soft tissue lesion noted to the plantar aspect of the left foot proximal to the left 5th MPJ with central  loss of skin lines and central nidus.    Neurologic:  Protective and light touch sensation intact bilateral lower extremity, positive paresthesias reported to left 3rd and 4th interspace, numbness of lateral left 4th digit reported, [place] Ambrocio monofilament 5.07 negative at left 4th digit  Musculoskeletal:  5/5 muscle strength noted bilateral foot, ankle joint range of motion is reduced without pain, reduced range of motion at the MPJ level bilateral foot, multiple contracted digits noted bilateral foot/hammertoe bilateral foot (especially left 4th: adductovarus), medial deviation of 1st metatarsal with prominent dorsal medial eminence bilateral 1st MPJ/bunion bilateral 1st MPJ, pain and tenderness with palpation of soft tissue lesion on the plantar aspect of the left foot just proximal to the 5th MPJ plantarly   Vascular: DP and PT pulses palpable 2/4 bilateral foot, capillary fill time less than 3 seconds to distal aspect of the digits bilateral foot      Assessment:      Encounter Diagnoses   Name Primary?    Plantar wart Yes    Left foot pain     Difficulty walking     Right foot drop      Plan:     Jazmine was seen today for hammer toe.    Diagnoses and all orders for this visit:    Plantar wart  -     Destruction of Benign Lesion    Left foot pain  -     Destruction of Benign Lesion    Difficulty walking  -     Destruction of Benign Lesion    Right foot drop    Other orders  -     Cancel: Excision of Lesion      I counseled the patient on her conditions, their implications and medical management.        1. Patient was examined and evaluated.    2. Discussed patient etiology of plantar verruca versus IPK.  Discussed conservative treatment options with the patient.  Patient was dispensed offloading pads for prevention of direct contact to the lesion.  Patient was warned of potential recurrence over time.  Patient was advised to perform safe debridement with a emery board, nail file or pumice  stone.  Destruction of Benign Lesion    Date/Time: 11/27/2023 8:30 AM    Performed by: Yazmin Crane DPM  Authorized by: Yazmin Crane DPM    Pre-Procedure:     Timeout: prior to procedure the correct patient, procedure, and site was verified      Anesthesia:  Topical anesthetic  Indications:     Destruction Indications: plantar verrucae.  Location:     Lower Extremity:  Foot    Detail:  Left foot  Prep:     Preparation: Patient was prepped and draped in usual sterile fashion    Procedure Details:     Cosmetic?: No      Number of lesions:  1    Destruction method:  Other    Bleeding:  None     Patient tolerated the procedure well with no immediate complications.     Post-operative instructions were provided for the patient.      3. Patient will continue with comfortable shoe gear both inside and outside the home.    4. Patient will follow-up in 2-3 months or p.r.n. for complaints

## 2024-03-15 ENCOUNTER — OFFICE VISIT (OUTPATIENT)
Dept: PODIATRY | Facility: CLINIC | Age: 54
End: 2024-03-15
Payer: COMMERCIAL

## 2024-03-15 VITALS — HEIGHT: 62 IN | BODY MASS INDEX: 37.29 KG/M2 | WEIGHT: 202.63 LBS

## 2024-03-15 DIAGNOSIS — M20.12 HALLUX VALGUS OF LEFT FOOT: Primary | ICD-10-CM

## 2024-03-15 DIAGNOSIS — M20.42 HAMMER TOE OF LEFT FOOT: ICD-10-CM

## 2024-03-15 DIAGNOSIS — L84 CORN OR CALLUS: ICD-10-CM

## 2024-03-15 PROCEDURE — 99213 OFFICE O/P EST LOW 20 MIN: CPT | Mod: S$GLB,,, | Performed by: PODIATRIST

## 2024-04-04 NOTE — PROGRESS NOTES
Subjective:     Patient ID: Jazmine Causey is a 53 y.o. female.    Chief Complaint: Jad Lucero is a 53 y.o. female who presents to the podiatry clinic  with complaint of  left foot pain. Onset of the symptoms was several weeks ago. Precipitating event: increased activity and progressive bunion and hammertoe deformities . Current symptoms include: ability to bear weight, but with some pain and worsening symptoms after a period of activity. Aggravating factors:  ill fitted shoes and growth of painful calluses . Symptoms have been intermittent. Patient has had prior foot problems. Treatment to date:  debridement of calluses . Patients rates pain 5/10 on pain scale.    Review of Systems   Constitutional: Negative for chills and fever.   Cardiovascular:  Negative for chest pain and leg swelling.   Respiratory:  Negative for cough and shortness of breath.    Gastrointestinal:  Negative for diarrhea, nausea and vomiting.        Objective:     Physical Exam  Vitals reviewed.   Constitutional:       General: She is not in acute distress.     Appearance: Normal appearance. She is not ill-appearing.   HENT:      Head: Normocephalic.      Nose: Nose normal.   Pulmonary:      Effort: Pulmonary effort is normal. No respiratory distress.   Skin:     Capillary Refill: Capillary refill takes 2 to 3 seconds.   Neurological:      Mental Status: She is alert and oriented to person, place, and time.   Psychiatric:         Mood and Affect: Mood normal.         Behavior: Behavior normal.         Thought Content: Thought content normal.         Judgment: Judgment normal.     Dermatologic:  No open lesions noted bilateral foot, no rashes noted bilateral foot, no interdigital maceration noted bilateral foot   Neurologic:  Protective and light touch sensation intact bilateral lower extremity, positive paresthesias reported to left 3rd and 4th interspace, numbness of lateral left 4th digit reported, [place] Ambrocio monofilament  5.07 negative at left 4th digit  Musculoskeletal:  5/5 muscle strength noted bilateral foot, ankle joint range of motion is reduced without pain, reduced range of motion at the MPJ level bilateral foot, multiple contracted digits noted bilateral foot/hammertoe bilateral foot (especially left 4th: adductovarus), medial deviation of 1st metatarsal with prominent dorsal medial eminence bilateral 1st MPJ/bunion bilateral 1st MPJ, left foot greater than right   Vascular: DP and PT pulses palpable 2/4 bilateral foot, capillary fill time less than 3 seconds to distal aspect of the digits bilateral foot      Assessment:      Encounter Diagnoses   Name Primary?    Hallux valgus of left foot Yes    Hammer toe of left foot     Corn or callus      Plan:     [facility]    Diagnoses and all orders for this visit:    Hallux valgus of left foot    Hammer toe of left foot    Corn or callus      I counseled the patient on her conditions, their implications and medical management.        1. Patient was examined and evaluated.    2. Discussed with patient etiology of bunion deformity/hallux abductovalgus.  Discussed conservative treatment options with the patient.  Patient was advised to adjust shoe gear for better comfort and fit including increased toe box height and width and selection shoe gear with soft stretchable uppers.  Discussed with patient potential benefits of a local corticosteroid injection or oral Medrol Dosepak for improvement of pain and inflammation.  Patient was advised that she can adjunct with NSAIDs for pain relief as well.  Briefly discussed with patient surgical intervention including Kel bunionectomy.    3. Patient was advised of the etiology of hammertoe deformity.  Patient was advised to adjust shoe gear for better comfort and fit including increased toe box height and width and selection shoe gear with soft stretchable uppers.  4. Discussed with patient etiology of callus  formation.  Patient was warned of potential recurrence over time.  Patient was dispensed offloading pads for reduction of direct contact to the lesion.  Patient was advised to perform safe debridement at home with a emery board, nail file or pumice stone.  Patient was advised to apply ointments / moisturizer to the area for softening purposes.  Patient had mild debridement of calluses in the clinic without incident.  5. Patient will follow-up p.r.n. for complaints

## 2024-04-09 ENCOUNTER — OFFICE VISIT (OUTPATIENT)
Dept: PODIATRY | Facility: CLINIC | Age: 54
End: 2024-04-09
Payer: COMMERCIAL

## 2024-04-09 VITALS — HEIGHT: 62 IN | BODY MASS INDEX: 37.17 KG/M2 | WEIGHT: 202 LBS

## 2024-04-09 DIAGNOSIS — M79.672 LEFT FOOT PAIN: ICD-10-CM

## 2024-04-09 DIAGNOSIS — M20.42 HAMMER TOE OF LEFT FOOT: Primary | ICD-10-CM

## 2024-04-09 PROCEDURE — 3008F BODY MASS INDEX DOCD: CPT | Mod: CPTII,S$GLB,, | Performed by: PODIATRIST

## 2024-04-09 PROCEDURE — 99213 OFFICE O/P EST LOW 20 MIN: CPT | Mod: S$GLB,,, | Performed by: PODIATRIST

## 2024-04-09 RX ORDER — ATORVASTATIN CALCIUM 20 MG/1
TABLET, FILM COATED ORAL
COMMUNITY
Start: 2024-03-26

## 2024-04-09 RX ORDER — LEVOTHYROXINE SODIUM 112 UG/1
112 TABLET ORAL
COMMUNITY
Start: 2024-04-08

## 2024-04-23 ENCOUNTER — OFFICE VISIT (OUTPATIENT)
Dept: PODIATRY | Facility: CLINIC | Age: 54
End: 2024-04-23
Payer: COMMERCIAL

## 2024-04-23 VITALS — BODY MASS INDEX: 37.17 KG/M2 | WEIGHT: 202 LBS | HEIGHT: 62 IN

## 2024-04-23 DIAGNOSIS — M77.8 EXTENSOR TENDINITIS OF FOOT: Primary | ICD-10-CM

## 2024-04-23 DIAGNOSIS — M20.12 HALLUX VALGUS, LEFT: ICD-10-CM

## 2024-04-23 DIAGNOSIS — G57.92 NEURITIS OF LEFT FOOT: ICD-10-CM

## 2024-04-23 PROCEDURE — 29540 STRAPPING ANKLE &/FOOT: CPT | Mod: LT,S$GLB,, | Performed by: PODIATRIST

## 2024-04-23 PROCEDURE — 1159F MED LIST DOCD IN RCRD: CPT | Mod: CPTII,S$GLB,, | Performed by: PODIATRIST

## 2024-04-23 PROCEDURE — 3008F BODY MASS INDEX DOCD: CPT | Mod: CPTII,S$GLB,, | Performed by: PODIATRIST

## 2024-04-23 PROCEDURE — 99213 OFFICE O/P EST LOW 20 MIN: CPT | Mod: 25,S$GLB,, | Performed by: PODIATRIST

## 2024-04-23 NOTE — PROGRESS NOTES
Subjective:     Patient ID: Jazmine Causey is a 53 y.o. female.    Chief Complaint: Burning Feeling  (Burning feeling of the left foot )    Jazmine is a 53 y.o. female who presents to the podiatry clinic  with complaint of  left foot pain. Onset of the symptoms was several months ago. Precipitating event: none known. Current symptoms include: ability to bear weight, but with some pain, worsening symptoms after a period of activity, and burning and tingling between the digits . Aggravating factors: running. Symptoms have progressed to a point and plateaued. Patient has had prior foot problems.Treatment to date: avoidance of offending activity and OTC analgesics which are not very effective. Patients rates pain 4/10 on pain scale.    Review of Systems   Constitutional: Negative for chills and fever.   Cardiovascular:  Negative for chest pain and leg swelling.   Respiratory:  Negative for cough and shortness of breath.    Gastrointestinal:  Negative for diarrhea, nausea and vomiting.   Neurological:  Positive for numbness and paresthesias.        Objective:     Physical Exam  Vitals reviewed.   Constitutional:       General: She is not in acute distress.     Appearance: Normal appearance. She is not ill-appearing.   HENT:      Head: Normocephalic.      Nose: Nose normal.   Pulmonary:      Effort: Pulmonary effort is normal. No respiratory distress.   Skin:     Capillary Refill: Capillary refill takes 2 to 3 seconds.   Neurological:      Mental Status: She is alert and oriented to person, place, and time.   Psychiatric:         Mood and Affect: Mood normal.         Behavior: Behavior normal.         Thought Content: Thought content normal.         Judgment: Judgment normal.     Dermatologic:  No open lesions noted bilateral foot, no rashes noted bilateral foot, no interdigital maceration noted bilateral foot   Neurologic:  Protective and light touch sensation intact bilateral lower extremity, positive paresthesias  reported to left 3rd and 4th interspace, numbness of lateral left 4th digit reported, [place] Ambrocio monofilament 5.07 negative at left 4th digit  Musculoskeletal:  5/5 muscle strength noted bilateral foot, ankle joint range of motion is reduced without pain, reduced range of motion at the MPJ level bilateral foot, multiple contracted digits noted bilateral foot/hammertoe bilateral foot (especially left 4th: adductovarus), medial deviation of 1st metatarsal with prominent dorsal medial eminence bilateral 1st MPJ/bunion bilateral 1st MPJ, left foot greater than right, mild pain and tenderness with range of motion and manipulation of the left 4th and 3rd digits particularly in dorsiflexion and plantar flexion   Vascular: DP and PT pulses palpable 2/4 bilateral foot, capillary fill time less than 3 seconds to distal aspect of the digits bilateral foot      Assessment:      Encounter Diagnoses   Name Primary?    Hallux valgus, left     Neuritis of left foot     Extensor tendinitis of foot Yes     Plan:     Jazmine was seen today for burning feeling .    Diagnoses and all orders for this visit:    Extensor tendinitis of foot    Hallux valgus, left    Neuritis of left foot      I counseled the patient on her conditions, their implications and medical management.        1. Patient was examined and evaluated.    2. Again discussed with patient etiology of neuritis complaints of the left foot and potential neuroma.  Discussed with patient potential benefits of local corticosteroid injection.  Patient states he does not warrant to have any invasive procedures done at this time.  Discussed with patient potential benefits of offloading the foot and structural control with taping.    3. Discussed with patient extensor tendinitis as an accommodation of left foot pain.  Patient was shown active and passive range of motion techniques.  Patient had a low dye ankle/foot taping and strapping applied to the left lower extremity.   Patient will keep this dressing clean, dry, and intact for 1-3 days.  4. Discussed with patient etiology of bunion deformity/hallux abductovalgus.  Discussed conservative treatment options with the patient.  Patient was advised to adjust shoe gear for better comfort and fit including increased toe box height and width and selection shoe gear with soft stretchable uppers.  Discussed with patient potential benefits of a local corticosteroid injection or oral Medrol Dosepak for improvement of pain and inflammation.  Patient was advised that she can adjunct with NSAIDs for pain relief as well.  Briefly discussed with patient surgical intervention including Kel bunionectomy.    5. Patient was advised of the etiology of hammertoe deformity.  Patient was advised to adjust shoe gear for better comfort and fit including increased toe box height and width and selection shoe gear with soft stretchable uppers.  6. Patient will follow-up in 2 weeks p.r.n. for complaints

## 2024-04-23 NOTE — PROCEDURES
"Procedures  STRAPPING/TAPING OF ANKLE AND FOOT:  LOW DYE LEFT FOOT    Patient had a low dye taping applied to the left foot-1 in athletic tape applied from lateral 5th MPJ around the ankle to medial 1st MPJ x 2 with two plantar straps of 2 inch athletic tape applied from medial to lateral from just distal to the heel to the level of the midfoot. Then,  1 in athletic tape applied from lateral 5th MPJ around the ankle to medial 1st MPJ x 2 along with a Zavala's rest strap composed of 3 " tensoplast. Keep taping clean, dry, and intact for 1 - 3 days.    "

## 2024-04-24 NOTE — PROGRESS NOTES
Subjective:     Patient ID: Jazmine Causey is a 53 y.o. female.    Chief Complaint: Foot Pain    Jazmine is a 53 y.o. female who presents to the podiatry clinic  with complaint of  left foot pain. Onset of the symptoms was several weeks ago. Precipitating event: none known. Current symptoms include: ability to bear weight, but with some pain, stiffness, and worsening symptoms after a period of activity. Aggravating factors: walking. Symptoms have waxed and waned but are better overall. Patient has had prior foot problems. Treatment to date: avoidance of offending activity, ice, OTC analgesics which are somewhat effective, and rest. Patients rates pain 5/10 on pain scale.    Review of Systems   Constitutional: Negative for chills and fever.   Cardiovascular:  Negative for chest pain and leg swelling.   Respiratory:  Negative for cough and shortness of breath.    Gastrointestinal:  Negative for diarrhea, nausea and vomiting.   Neurological:  Positive for focal weakness, numbness and paresthesias.        Objective:     Physical Exam  Vitals reviewed.   Constitutional:       General: She is not in acute distress.     Appearance: Normal appearance. She is not ill-appearing.   HENT:      Head: Normocephalic.      Nose: Nose normal.   Pulmonary:      Effort: Pulmonary effort is normal. No respiratory distress.   Skin:     Capillary Refill: Capillary refill takes 2 to 3 seconds.   Neurological:      Mental Status: She is alert and oriented to person, place, and time.   Psychiatric:         Mood and Affect: Mood normal.         Behavior: Behavior normal.         Thought Content: Thought content normal.         Judgment: Judgment normal.       Dermatologic:  No open lesions noted bilateral foot, no rashes noted bilateral foot, no interdigital maceration noted bilateral foot   Neurologic:  Protective and light touch sensation intact bilateral lower extremity, positive paresthesias reported to left 3rd and 4th interspace,  numbness of lateral left 4th digit reported, [place] Ambrocio monofilament 5.07 negative at left 4th digit  Musculoskeletal:  5/5 muscle strength noted bilateral foot, ankle joint range of motion is reduced without pain, reduced range of motion at the MPJ level bilateral foot, multiple contracted digits noted bilateral foot/hammertoe bilateral foot (especially left 4th: adductovarus), medial deviation of 1st metatarsal with prominent dorsal medial eminence bilateral 1st MPJ/bunion bilateral 1st MPJ, left foot greater than right, mild pain and tenderness at distal tip of the left 4th digit in the dorsal aspect of left 4th and 3rd digit   Vascular: DP and PT pulses palpable 2/4 bilateral foot, capillary fill time less than 3 seconds to distal aspect of the digits bilateral foot    Assessment:      Encounter Diagnoses   Name Primary?    Hammer toe of left foot Yes    Left foot pain      Plan:     Jazmine was seen today for foot pain.    Diagnoses and all orders for this visit:    Hammer toe of left foot    Left foot pain      I counseled the patient on her conditions, their implications and medical management.        1. Patient was examined and evaluated.    2. Discussed with patient continued symptomology from hammertoe deformity of the left foot and possible increased metatarsal parabola angle between the 3rd and 4th MPJs.  Discussed with patient risks and benefits of surgical intervention for correction of hammertoes via either arthroplasty or arthrodesis of the digits.  Patient made aware that secondary to more proximal issues with ambulation surgery may not be warranted.  3. Patient was dispensed a gel offloading toe crest pad for prevention of pressure distal tip of left 3rd and 4th digits.  Discussed with patient potential benefits of local corticosteroid injection oral Medrol Dosepak for pain complaints.  Patient was advised ice and elevate the left lower extremity end of days   4. Patient will follow-up in  2-3 weeks or p.r.n. for complaints

## 2024-11-05 ENCOUNTER — HOSPITAL ENCOUNTER (OUTPATIENT)
Dept: RADIOLOGY | Facility: HOSPITAL | Age: 54
Discharge: HOME | End: 2024-11-05
Attending: STUDENT IN AN ORGANIZED HEALTH CARE EDUCATION/TRAINING PROGRAM
Payer: COMMERCIAL

## 2024-11-05 DIAGNOSIS — Z12.31 ENCOUNTER FOR SCREENING MAMMOGRAM FOR BREAST CANCER: ICD-10-CM

## 2024-11-05 PROCEDURE — 77067 SCR MAMMO BI INCL CAD: CPT | Mod: TC

## 2024-11-05 PROCEDURE — 77063 BREAST TOMOSYNTHESIS BI: CPT | Mod: 26,,, | Performed by: RADIOLOGY

## 2024-11-05 PROCEDURE — 77067 SCR MAMMO BI INCL CAD: CPT | Mod: 26,,, | Performed by: RADIOLOGY
